# Patient Record
Sex: MALE | Race: WHITE | NOT HISPANIC OR LATINO | Employment: FULL TIME | URBAN - METROPOLITAN AREA
[De-identification: names, ages, dates, MRNs, and addresses within clinical notes are randomized per-mention and may not be internally consistent; named-entity substitution may affect disease eponyms.]

---

## 2020-12-28 ENCOUNTER — OFFICE VISIT (OUTPATIENT)
Dept: URGENT CARE | Facility: CLINIC | Age: 55
End: 2020-12-28
Payer: COMMERCIAL

## 2020-12-28 ENCOUNTER — APPOINTMENT (EMERGENCY)
Dept: RADIOLOGY | Facility: HOSPITAL | Age: 55
DRG: 137 | End: 2020-12-28
Payer: COMMERCIAL

## 2020-12-28 ENCOUNTER — HOSPITAL ENCOUNTER (INPATIENT)
Facility: HOSPITAL | Age: 55
LOS: 3 days | Discharge: HOME | DRG: 137 | End: 2020-12-31
Attending: EMERGENCY MEDICINE | Admitting: FAMILY MEDICINE
Payer: COMMERCIAL

## 2020-12-28 VITALS
DIASTOLIC BLOOD PRESSURE: 73 MMHG | SYSTOLIC BLOOD PRESSURE: 110 MMHG | OXYGEN SATURATION: 90 % | BODY MASS INDEX: 42.95 KG/M2 | HEART RATE: 102 BPM | TEMPERATURE: 100.7 F | WEIGHT: 290 LBS | HEIGHT: 69 IN | RESPIRATION RATE: 22 BRPM

## 2020-12-28 DIAGNOSIS — U07.1 PNEUMONIA DUE TO COVID-19 VIRUS: ICD-10-CM

## 2020-12-28 DIAGNOSIS — J12.82 PNEUMONIA DUE TO COVID-19 VIRUS: ICD-10-CM

## 2020-12-28 DIAGNOSIS — R09.02 HYPOXIA: ICD-10-CM

## 2020-12-28 DIAGNOSIS — R09.02 HYPOXIA: Primary | ICD-10-CM

## 2020-12-28 DIAGNOSIS — R68.89 FLU-LIKE SYMPTOMS: Primary | ICD-10-CM

## 2020-12-28 DIAGNOSIS — U07.1 COVID-19: ICD-10-CM

## 2020-12-28 PROBLEM — E66.01 CLASS 3 SEVERE OBESITY DUE TO EXCESS CALORIES WITHOUT SERIOUS COMORBIDITY WITH BODY MASS INDEX (BMI) OF 40.0 TO 44.9 IN ADULT (HCC): Status: ACTIVE | Noted: 2020-12-28

## 2020-12-28 PROBLEM — E66.813 CLASS 3 SEVERE OBESITY DUE TO EXCESS CALORIES WITHOUT SERIOUS COMORBIDITY WITH BODY MASS INDEX (BMI) OF 40.0 TO 44.9 IN ADULT: Status: ACTIVE | Noted: 2020-12-28

## 2020-12-28 PROBLEM — G25.0 ESSENTIAL TREMOR: Status: ACTIVE | Noted: 2020-12-28

## 2020-12-28 PROBLEM — E87.6 HYPOKALEMIA: Status: ACTIVE | Noted: 2020-12-28

## 2020-12-28 PROBLEM — D69.6 THROMBOCYTOPENIA (HCC): Status: ACTIVE | Noted: 2020-12-28

## 2020-12-28 LAB
ALBUMIN SERPL BCP-MCNC: 3.3 G/DL (ref 3.5–5)
ALP SERPL-CCNC: 72 U/L (ref 46–116)
ALT SERPL W P-5'-P-CCNC: 77 U/L (ref 12–78)
ANION GAP SERPL CALCULATED.3IONS-SCNC: 12 MMOL/L (ref 4–13)
AST SERPL W P-5'-P-CCNC: 61 U/L (ref 5–45)
BASOPHILS # BLD AUTO: 0 THOUSANDS/ΜL (ref 0–0.1)
BASOPHILS NFR BLD AUTO: 0 % (ref 0–1)
BILIRUB SERPL-MCNC: 1.01 MG/DL (ref 0.2–1)
BUN SERPL-MCNC: 23 MG/DL (ref 5–25)
CALCIUM ALBUM COR SERPL-MCNC: 9.4 MG/DL (ref 8.3–10.1)
CALCIUM SERPL-MCNC: 8.8 MG/DL (ref 8.3–10.1)
CHLORIDE SERPL-SCNC: 103 MMOL/L (ref 100–108)
CK MB SERPL-MCNC: 0.7 NG/ML (ref 0–5)
CK MB SERPL-MCNC: <1 % (ref 0–2.5)
CK SERPL-CCNC: 405 U/L (ref 39–308)
CO2 SERPL-SCNC: 24 MMOL/L (ref 21–32)
CREAT SERPL-MCNC: 1.15 MG/DL (ref 0.6–1.3)
CRP SERPL QL: 36.7 MG/L
D DIMER PPP FEU-MCNC: 1.13 UG/ML FEU
EOSINOPHIL # BLD AUTO: 0.12 THOUSAND/ΜL (ref 0–0.61)
EOSINOPHIL NFR BLD AUTO: 2 % (ref 0–6)
ERYTHROCYTE [DISTWIDTH] IN BLOOD BY AUTOMATED COUNT: 12.5 % (ref 11.6–15.1)
FLUAV RNA RESP QL NAA+PROBE: NEGATIVE
FLUBV RNA RESP QL NAA+PROBE: NEGATIVE
GFR SERPL CREATININE-BSD FRML MDRD: 71 ML/MIN/1.73SQ M
GLUCOSE SERPL-MCNC: 115 MG/DL (ref 65–140)
HCT VFR BLD AUTO: 39.3 % (ref 36.5–49.3)
HGB BLD-MCNC: 13.1 G/DL (ref 12–17)
IMM GRANULOCYTES # BLD AUTO: 0.03 THOUSAND/UL (ref 0–0.2)
IMM GRANULOCYTES NFR BLD AUTO: 0 % (ref 0–2)
LACTATE SERPL-SCNC: 1.5 MMOL/L (ref 0.5–2)
LYMPHOCYTES # BLD AUTO: 0.43 THOUSANDS/ΜL (ref 0.6–4.47)
LYMPHOCYTES NFR BLD AUTO: 6 % (ref 14–44)
MCH RBC QN AUTO: 27.9 PG (ref 26.8–34.3)
MCHC RBC AUTO-ENTMCNC: 33.3 G/DL (ref 31.4–37.4)
MCV RBC AUTO: 84 FL (ref 82–98)
MONOCYTES # BLD AUTO: 0.75 THOUSAND/ΜL (ref 0.17–1.22)
MONOCYTES NFR BLD AUTO: 10 % (ref 4–12)
NEUTROPHILS # BLD AUTO: 6.11 THOUSANDS/ΜL (ref 1.85–7.62)
NEUTS SEG NFR BLD AUTO: 82 % (ref 43–75)
NRBC BLD AUTO-RTO: 0 /100 WBCS
NT-PROBNP SERPL-MCNC: 534 PG/ML
PLATELET # BLD AUTO: 98 THOUSANDS/UL (ref 149–390)
PMV BLD AUTO: 13.6 FL (ref 8.9–12.7)
POTASSIUM SERPL-SCNC: 3.3 MMOL/L (ref 3.5–5.3)
PROT SERPL-MCNC: 6.4 G/DL (ref 6.4–8.2)
RBC # BLD AUTO: 4.7 MILLION/UL (ref 3.88–5.62)
RSV RNA RESP QL NAA+PROBE: NEGATIVE
SARS-COV-2 RNA RESP QL NAA+PROBE: POSITIVE
SODIUM SERPL-SCNC: 139 MMOL/L (ref 136–145)
TROPONIN I SERPL-MCNC: 0.02 NG/ML
WBC # BLD AUTO: 7.44 THOUSAND/UL (ref 4.31–10.16)

## 2020-12-28 PROCEDURE — 83605 ASSAY OF LACTIC ACID: CPT | Performed by: EMERGENCY MEDICINE

## 2020-12-28 PROCEDURE — 99283 EMERGENCY DEPT VISIT LOW MDM: CPT | Performed by: PHYSICIAN ASSISTANT

## 2020-12-28 PROCEDURE — 0241U HB NFCT DS VIR RESP RNA 4 TRGT: CPT | Performed by: EMERGENCY MEDICINE

## 2020-12-28 PROCEDURE — 80053 COMPREHEN METABOLIC PANEL: CPT | Performed by: EMERGENCY MEDICINE

## 2020-12-28 PROCEDURE — 99285 EMERGENCY DEPT VISIT HI MDM: CPT | Performed by: EMERGENCY MEDICINE

## 2020-12-28 PROCEDURE — 86901 BLOOD TYPING SEROLOGIC RH(D): CPT | Performed by: NURSE PRACTITIONER

## 2020-12-28 PROCEDURE — 82728 ASSAY OF FERRITIN: CPT | Performed by: NURSE PRACTITIONER

## 2020-12-28 PROCEDURE — 99285 EMERGENCY DEPT VISIT HI MDM: CPT

## 2020-12-28 PROCEDURE — 85025 COMPLETE CBC W/AUTO DIFF WBC: CPT | Performed by: EMERGENCY MEDICINE

## 2020-12-28 PROCEDURE — 83880 ASSAY OF NATRIURETIC PEPTIDE: CPT | Performed by: EMERGENCY MEDICINE

## 2020-12-28 PROCEDURE — 99203 OFFICE O/P NEW LOW 30 MIN: CPT | Performed by: PHYSICIAN ASSISTANT

## 2020-12-28 PROCEDURE — G0382 LEV 3 HOSP TYPE B ED VISIT: HCPCS | Performed by: PHYSICIAN ASSISTANT

## 2020-12-28 PROCEDURE — 71045 X-RAY EXAM CHEST 1 VIEW: CPT

## 2020-12-28 PROCEDURE — 93005 ELECTROCARDIOGRAM TRACING: CPT

## 2020-12-28 PROCEDURE — 36415 COLL VENOUS BLD VENIPUNCTURE: CPT | Performed by: EMERGENCY MEDICINE

## 2020-12-28 PROCEDURE — 96361 HYDRATE IV INFUSION ADD-ON: CPT

## 2020-12-28 PROCEDURE — 96360 HYDRATION IV INFUSION INIT: CPT

## 2020-12-28 PROCEDURE — 86140 C-REACTIVE PROTEIN: CPT | Performed by: NURSE PRACTITIONER

## 2020-12-28 PROCEDURE — 99223 1ST HOSP IP/OBS HIGH 75: CPT | Performed by: NURSE PRACTITIONER

## 2020-12-28 PROCEDURE — 84145 PROCALCITONIN (PCT): CPT | Performed by: NURSE PRACTITIONER

## 2020-12-28 PROCEDURE — 84484 ASSAY OF TROPONIN QUANT: CPT | Performed by: EMERGENCY MEDICINE

## 2020-12-28 PROCEDURE — 85379 FIBRIN DEGRADATION QUANT: CPT | Performed by: NURSE PRACTITIONER

## 2020-12-28 PROCEDURE — 86900 BLOOD TYPING SEROLOGIC ABO: CPT | Performed by: NURSE PRACTITIONER

## 2020-12-28 PROCEDURE — 82553 CREATINE MB FRACTION: CPT | Performed by: NURSE PRACTITIONER

## 2020-12-28 PROCEDURE — 82550 ASSAY OF CK (CPK): CPT | Performed by: NURSE PRACTITIONER

## 2020-12-28 RX ORDER — CEFTRIAXONE 1 G/50ML
1000 INJECTION, SOLUTION INTRAVENOUS EVERY 24 HOURS
Status: DISCONTINUED | OUTPATIENT
Start: 2020-12-29 | End: 2020-12-31 | Stop reason: HOSPADM

## 2020-12-28 RX ORDER — POTASSIUM CHLORIDE 20 MEQ/1
40 TABLET, EXTENDED RELEASE ORAL ONCE
Status: COMPLETED | OUTPATIENT
Start: 2020-12-28 | End: 2020-12-28

## 2020-12-28 RX ORDER — ASCORBIC ACID 500 MG
1000 TABLET ORAL EVERY 12 HOURS SCHEDULED
Status: DISCONTINUED | OUTPATIENT
Start: 2020-12-28 | End: 2020-12-31 | Stop reason: HOSPADM

## 2020-12-28 RX ORDER — SODIUM CHLORIDE 9 MG/ML
150 INJECTION, SOLUTION INTRAVENOUS CONTINUOUS
Status: DISCONTINUED | OUTPATIENT
Start: 2020-12-28 | End: 2020-12-28

## 2020-12-28 RX ORDER — FAMOTIDINE 20 MG/1
20 TABLET, FILM COATED ORAL 2 TIMES DAILY
Status: DISCONTINUED | OUTPATIENT
Start: 2020-12-28 | End: 2020-12-31 | Stop reason: HOSPADM

## 2020-12-28 RX ORDER — DEXAMETHASONE SODIUM PHOSPHATE 4 MG/ML
6 INJECTION, SOLUTION INTRA-ARTICULAR; INTRALESIONAL; INTRAMUSCULAR; INTRAVENOUS; SOFT TISSUE EVERY 24 HOURS
Status: DISCONTINUED | OUTPATIENT
Start: 2020-12-28 | End: 2020-12-31 | Stop reason: HOSPADM

## 2020-12-28 RX ORDER — CEFTRIAXONE 1 G/50ML
1000 INJECTION, SOLUTION INTRAVENOUS ONCE
Status: COMPLETED | OUTPATIENT
Start: 2020-12-28 | End: 2020-12-28

## 2020-12-28 RX ORDER — ACETAMINOPHEN 325 MG/1
650 TABLET ORAL EVERY 6 HOURS PRN
Status: DISCONTINUED | OUTPATIENT
Start: 2020-12-28 | End: 2020-12-31 | Stop reason: HOSPADM

## 2020-12-28 RX ORDER — ZINC SULFATE 50(220)MG
220 CAPSULE ORAL DAILY
Status: DISCONTINUED | OUTPATIENT
Start: 2020-12-29 | End: 2020-12-31 | Stop reason: HOSPADM

## 2020-12-28 RX ORDER — CALCIUM CARBONATE 200(500)MG
500 TABLET,CHEWABLE ORAL DAILY PRN
Status: DISCONTINUED | OUTPATIENT
Start: 2020-12-28 | End: 2020-12-31 | Stop reason: HOSPADM

## 2020-12-28 RX ORDER — MULTIVITAMIN/IRON/FOLIC ACID 18MG-0.4MG
1 TABLET ORAL DAILY
Status: DISCONTINUED | OUTPATIENT
Start: 2021-01-05 | End: 2020-12-31 | Stop reason: HOSPADM

## 2020-12-28 RX ORDER — CEFTRIAXONE 1 G/50ML
1000 INJECTION, SOLUTION INTRAVENOUS ONCE
Status: DISCONTINUED | OUTPATIENT
Start: 2020-12-29 | End: 2020-12-28

## 2020-12-28 RX ORDER — MELATONIN
2000 DAILY
Status: DISCONTINUED | OUTPATIENT
Start: 2020-12-29 | End: 2020-12-31 | Stop reason: HOSPADM

## 2020-12-28 RX ADMIN — CALCIUM CARBONATE (ANTACID) CHEW TAB 500 MG 500 MG: 500 CHEW TAB at 21:56

## 2020-12-28 RX ADMIN — FAMOTIDINE 20 MG: 20 TABLET, FILM COATED ORAL at 21:25

## 2020-12-28 RX ADMIN — OXYCODONE HYDROCHLORIDE AND ACETAMINOPHEN 1000 MG: 500 TABLET ORAL at 21:25

## 2020-12-28 RX ADMIN — DOXYCYCLINE 100 MG: 100 INJECTION, POWDER, LYOPHILIZED, FOR SOLUTION INTRAVENOUS at 22:45

## 2020-12-28 RX ADMIN — CEFTRIAXONE 1000 MG: 1 INJECTION, SOLUTION INTRAVENOUS at 21:21

## 2020-12-28 RX ADMIN — SODIUM CHLORIDE 150 ML/HR: 0.9 INJECTION, SOLUTION INTRAVENOUS at 21:24

## 2020-12-28 RX ADMIN — DEXAMETHASONE SODIUM PHOSPHATE 6 MG: 4 INJECTION, SOLUTION INTRAMUSCULAR; INTRAVENOUS at 21:25

## 2020-12-28 RX ADMIN — SODIUM CHLORIDE 150 ML/HR: 0.9 INJECTION, SOLUTION INTRAVENOUS at 17:41

## 2020-12-28 RX ADMIN — POTASSIUM CHLORIDE 40 MEQ: 1500 TABLET, EXTENDED RELEASE ORAL at 21:25

## 2020-12-28 NOTE — ED PROVIDER NOTES
History  Chief Complaint   Patient presents with    Shortness of Breath     pt  seen at urgent care today fro " not feeling well", r/o covid symptoms, sob with exertion, 89% on room air upon arrival to ED , placed on 2l O@ 95%, productive cough of green sputum     71-year-old male complains of generally not feeling well since 12/23, not sleeping well, his boss sent him for COVID testing at urgent care was found to be hypoxemic and referred emergency department for evaluation  Denies fever and chills  Denies loss of taste or smell  Denies chest pain dyspnea  Past medical history includes  related toxic inhalation, tremor  Surgical history denies  Social history denies smoking drinking illicits      History provided by:  Patient  Shortness of Breath  Severity:  Mild  Onset quality:  Gradual  Timing:  Constant  Progression:  Unchanged  Chronicity:  New  Context: URI    Ineffective treatments:  None tried  Associated symptoms: cough    Associated symptoms: no abdominal pain, no chest pain, no hemoptysis, no sputum production and no vomiting        None       Past Medical History:   Diagnosis Date    Known health problems: none        Past Surgical History:   Procedure Laterality Date    NO PAST SURGERIES         Family History   Problem Relation Age of Onset    Heart disease Mother     COPD Father      I have reviewed and agree with the history as documented  E-Cigarette/Vaping    E-Cigarette Use Never User      E-Cigarette/Vaping Substances    Nicotine No     CBD No     Flavoring No      Social History     Tobacco Use    Smoking status: Never Smoker    Smokeless tobacco: Never Used   Substance Use Topics    Alcohol use: Never     Frequency: Never    Drug use: Never       Review of Systems   Respiratory: Positive for cough and shortness of breath  Negative for hemoptysis and sputum production  Cardiovascular: Negative for chest pain     Gastrointestinal: Negative for abdominal pain and vomiting  All other systems reviewed and are negative  Physical Exam  Physical Exam  Vitals signs and nursing note reviewed  Constitutional:       Comments: Pleasant, comfortable-appearing, conversational, upper extremities generally tremulous, mildly   HENT:      Head: Normocephalic and atraumatic  Eyes:      Conjunctiva/sclera: Conjunctivae normal       Pupils: Pupils are equal, round, and reactive to light  Neck:      Musculoskeletal: Neck supple  Cardiovascular:      Rate and Rhythm: Normal rate and regular rhythm  Heart sounds: Normal heart sounds  Pulmonary:      Effort: Pulmonary effort is normal       Comments: Crackles bilat  Abdominal:      General: Bowel sounds are normal  There is no distension  Palpations: Abdomen is soft  Tenderness: There is no abdominal tenderness  Musculoskeletal: Normal range of motion  Right lower leg: No edema  Left lower leg: No edema  Skin:     General: Skin is warm and dry  Neurological:      Mental Status: He is alert and oriented to person, place, and time  Cranial Nerves: No cranial nerve deficit  Coordination: Coordination normal    Psychiatric:         Behavior: Behavior normal          Thought Content:  Thought content normal          Judgment: Judgment normal          Vital Signs  ED Triage Vitals   Temperature Pulse Respirations Blood Pressure SpO2   12/28/20 1733 12/28/20 1733 12/28/20 1733 12/28/20 1733 12/28/20 1730   99 3 °F (37 4 °C) 91 22 103/75 95 %      Temp Source Heart Rate Source Patient Position - Orthostatic VS BP Location FiO2 (%)   12/28/20 1733 12/28/20 1733 12/28/20 1733 12/28/20 1733 --   Temporal Monitor Sitting Left arm       Pain Score       --                  Vitals:    12/28/20 1733   BP: 103/75   Pulse: 91   Patient Position - Orthostatic VS: Sitting         Visual Acuity  Visual Acuity      Most Recent Value   L Pupil Size (mm)  2   R Pupil Size (mm)  2          ED Medications  Medications   sodium chloride 0 9 % infusion (150 mL/hr Intravenous New Bag 12/28/20 1741)       Diagnostic Studies  Results Reviewed     Procedure Component Value Units Date/Time    COVID19, Influenza A/B, RSV PCR, [facility] [[id number]]  (Abnormal) Collected: 12/28/20 1742    Lab Status: Final result Specimen: Nares from Nasopharyngeal Swab Updated: 12/28/20 1843     SARS-CoV-2 Positive     INFLUENZA A PCR Negative     INFLUENZA B PCR Negative     RSV PCR Negative    Narrative: This test has been authorized by FDA under an EUA (Emergency Use Assay) for use by authorized laboratories  Clinical caution and judgement should be used with the interpretation of these results with consideration of the clinical impression and other laboratory testing  Testing reported as "Positive" or "Negative" has been proven to be accurate according to standard laboratory validation requirements  All testing is performed with control materials showing appropriate reactivity at standard intervals      CBC and differential [[id number]]  (Abnormal) Collected: 12/28/20 1742    Lab Status: Final result Specimen: Blood from Hand, Left Updated: 12/28/20 1822     WBC 7 44 Thousand/uL      RBC 4 70 Million/uL      Hemoglobin 13 1 g/dL      Hematocrit 39 3 %      MCV 84 fL      MCH 27 9 pg      MCHC 33 3 g/dL      RDW 12 5 %      MPV 13 6 fL      Platelets 98 Thousands/uL      nRBC 0 /100 WBCs      Neutrophils Relative 82 %      Immat GRANS % 0 %      Lymphocytes Relative 6 %      Monocytes Relative 10 %      Eosinophils Relative 2 %      Basophils Relative 0 %      Neutrophils Absolute 6 11 Thousands/µL      Immature Grans Absolute 0 03 Thousand/uL      Lymphocytes Absolute 0 43 Thousands/µL      Monocytes Absolute 0 75 Thousand/µL      Eosinophils Absolute 0 12 Thousand/µL      Basophils Absolute 0 00 Thousands/µL     NT-BNP PRO [[id number]]  (Abnormal) Collected: 12/28/20 1742    Lab Status: Final result Specimen: Blood from Hand, Left Updated: 12/28/20 1821     NT-proBNP 534 pg/mL     Troponin I [[id number]]  (Normal) Collected: 12/28/20 1742    Lab Status: Final result Specimen: Blood from Arm, Left Updated: 12/28/20 1819     Troponin I 0 02 ng/mL     Lactic acid [[id number]]  (Normal) Collected: 12/28/20 1742    Lab Status: Final result Specimen: Blood from Hand, Left Updated: 12/28/20 1819     LACTIC ACID 1 5 mmol/L     Narrative:      Result may be elevated if tourniquet was used during collection  Comprehensive metabolic panel [[id number]]  (Abnormal) Collected: 12/28/20 1742    Lab Status: Final result Specimen: Blood from Hand, Left Updated: 12/28/20 1817     Sodium 139 mmol/L      Potassium 3 3 mmol/L      Chloride 103 mmol/L      CO2 24 mmol/L      ANION GAP 12 mmol/L      BUN 23 mg/dL      Creatinine 1 15 mg/dL      Glucose 115 mg/dL      Calcium 8 8 mg/dL      Corrected Calcium 9 4 mg/dL      AST 61 U/L      ALT 77 U/L      Alkaline Phosphatase 72 U/L      Total Protein 6 4 g/dL      Albumin 3 3 g/dL      Total Bilirubin 1 01 mg/dL      eGFR 71 ml/min/1 73sq m     Narrative:      [place] guidelines for Chronic Kidney Disease (CKD):     Stage 1 with normal or high GFR (GFR > 90 mL/min/1 73 square meters)    Stage 2 Mild CKD (GFR = 60-89 mL/min/1 73 square meters)    Stage 3A Moderate CKD (GFR = 45-59 mL/min/1 73 square meters)    Stage 3B Moderate CKD (GFR = 30-44 mL/min/1 73 square meters)    Stage 4 Severe CKD (GFR = 15-29 mL/min/1 73 square meters)    Stage 5 End Stage CKD (GFR <15 mL/min/1 73 square meters)  Note: GFR calculation is accurate only with a steady state creatinine                 XR chest 1 view portable   ED Interpretation by Rod Woo DO (12/28 1920)   Patchy bilateral infiltrates                 Procedures  Procedures         ED Course  ED Course as of Dec 28 1937   Mon Dec 28, 2020   8799 EKG 5:37 p m   Normal sinus rhythm rate 90 normal axis normal intervals T-wave inversion V1 V2 V3 aVL no ST elevation or depression interpreted by me      1936 Patient aware of results and agreeable with hospitalization                                              MDM    Disposition  Final diagnoses:   Hypoxia   COVID-19     Time reflects when diagnosis was documented in both MDM as applicable and the Disposition within this note     Time User Action Codes Description Comment    12/28/2020  7:21 PM Inocente Bradley Add [R09 02] Hypoxia     12/28/2020  7:21 PM Luke Quintero Add [U07 1] COVID-19       ED Disposition     ED Disposition Condition Date/Time Comment    Admit Stable Mon Dec 28, 2020  7:37 PM Case was discussed with Ugo and the patient's admission status was agreed to be Admission Status: inpatient status to the service of Dr Adame Doing          Yoseph Search    None         Patient's Medications    No medications on file     No discharge procedures on file      PDMP Review     None          ED Provider  Electronically Signed by           Edy Nation DO  12/28/20 1937

## 2020-12-29 PROBLEM — E66.01 MORBID OBESITY (HCC): Status: ACTIVE | Noted: 2020-12-29

## 2020-12-29 LAB
ABO GROUP BLD: NORMAL
ALBUMIN SERPL BCP-MCNC: 3.2 G/DL (ref 3.5–5)
ALP SERPL-CCNC: 69 U/L (ref 46–116)
ALT SERPL W P-5'-P-CCNC: 81 U/L (ref 12–78)
ANION GAP SERPL CALCULATED.3IONS-SCNC: 9 MMOL/L (ref 4–13)
AST SERPL W P-5'-P-CCNC: 60 U/L (ref 5–45)
BACTERIA UR QL AUTO: ABNORMAL /HPF
BASOPHILS # BLD AUTO: 0 THOUSANDS/ΜL (ref 0–0.1)
BASOPHILS NFR BLD AUTO: 0 % (ref 0–1)
BILIRUB SERPL-MCNC: 0.8 MG/DL (ref 0.2–1)
BILIRUB UR QL STRIP: NEGATIVE
BUN SERPL-MCNC: 24 MG/DL (ref 5–25)
CALCIUM ALBUM COR SERPL-MCNC: 9.2 MG/DL (ref 8.3–10.1)
CALCIUM SERPL-MCNC: 8.6 MG/DL (ref 8.3–10.1)
CHLORIDE SERPL-SCNC: 102 MMOL/L (ref 100–108)
CLARITY UR: CLEAR
CO2 SERPL-SCNC: 28 MMOL/L (ref 21–32)
COLOR UR: ABNORMAL
CREAT SERPL-MCNC: 1.22 MG/DL (ref 0.6–1.3)
EOSINOPHIL # BLD AUTO: 0 THOUSAND/ΜL (ref 0–0.61)
EOSINOPHIL NFR BLD AUTO: 0 % (ref 0–6)
ERYTHROCYTE [DISTWIDTH] IN BLOOD BY AUTOMATED COUNT: 12.7 % (ref 11.6–15.1)
FERRITIN SERPL-MCNC: 645 NG/ML (ref 8–388)
GFR SERPL CREATININE-BSD FRML MDRD: 66 ML/MIN/1.73SQ M
GLUCOSE SERPL-MCNC: 142 MG/DL (ref 65–140)
GLUCOSE UR STRIP-MCNC: NEGATIVE MG/DL
HCT VFR BLD AUTO: 36.4 % (ref 36.5–49.3)
HCT VFR BLD AUTO: 39.1 % (ref 36.5–49.3)
HGB BLD-MCNC: 12 G/DL (ref 12–17)
HGB BLD-MCNC: 12.6 G/DL (ref 12–17)
HGB UR QL STRIP.AUTO: NEGATIVE
HGB UR QL STRIP.AUTO: NEGATIVE
HYALINE CASTS #/AREA URNS LPF: ABNORMAL /LPF
IMM GRANULOCYTES # BLD AUTO: 0.03 THOUSAND/UL (ref 0–0.2)
IMM GRANULOCYTES NFR BLD AUTO: 1 % (ref 0–2)
KETONES UR STRIP-MCNC: ABNORMAL MG/DL
LDH SERPL-CCNC: 358 U/L (ref 81–234)
LEUKOCYTE ESTERASE UR QL STRIP: NEGATIVE
LYMPHOCYTES # BLD AUTO: 0.55 THOUSANDS/ΜL (ref 0.6–4.47)
LYMPHOCYTES NFR BLD AUTO: 11 % (ref 14–44)
MCH RBC QN AUTO: 27.5 PG (ref 26.8–34.3)
MCHC RBC AUTO-ENTMCNC: 32.2 G/DL (ref 31.4–37.4)
MCV RBC AUTO: 85 FL (ref 82–98)
MONOCYTES # BLD AUTO: 0.33 THOUSAND/ΜL (ref 0.17–1.22)
MONOCYTES NFR BLD AUTO: 6 % (ref 4–12)
NEUTROPHILS # BLD AUTO: 4.35 THOUSANDS/ΜL (ref 1.85–7.62)
NEUTS SEG NFR BLD AUTO: 82 % (ref 43–75)
NITRITE UR QL STRIP: NEGATIVE
NON-SQ EPI CELLS URNS QL MICRO: ABNORMAL /HPF
NRBC BLD AUTO-RTO: 0 /100 WBCS
PH UR STRIP.AUTO: 6.5 [PH]
PLATELET # BLD AUTO: 109 THOUSANDS/UL (ref 149–390)
PMV BLD AUTO: 13.5 FL (ref 8.9–12.7)
POTASSIUM SERPL-SCNC: 3.5 MMOL/L (ref 3.5–5.3)
PROCALCITONIN SERPL-MCNC: 0.12 NG/ML
PROT SERPL-MCNC: 6.3 G/DL (ref 6.4–8.2)
PROT UR STRIP-MCNC: ABNORMAL MG/DL
RBC # BLD AUTO: 4.58 MILLION/UL (ref 3.88–5.62)
RBC #/AREA URNS AUTO: ABNORMAL /HPF
RBC, URINE: ABNORMAL
RH BLD: NEGATIVE
SODIUM SERPL-SCNC: 139 MMOL/L (ref 136–145)
SP GR UR STRIP.AUTO: 1.02 (ref 1–1.03)
UROBILINOGEN UR QL STRIP.AUTO: 1 E.U./DL
WBC # BLD AUTO: 5.26 THOUSAND/UL (ref 4.31–10.16)
WBC #/AREA URNS AUTO: ABNORMAL /HPF

## 2020-12-29 PROCEDURE — 80053 COMPREHEN METABOLIC PANEL: CPT | Performed by: NURSE PRACTITIONER

## 2020-12-29 PROCEDURE — 85025 COMPLETE CBC W/AUTO DIFF WBC: CPT | Performed by: NURSE PRACTITIONER

## 2020-12-29 PROCEDURE — 99232 SBSQ HOSP IP/OBS MODERATE 35: CPT | Performed by: FAMILY MEDICINE

## 2020-12-29 PROCEDURE — XW13325 TRANSFUSION OF CONVALESCENT PLASMA (NONAUTOLOGOUS) INTO PERIPHERAL VEIN, PERCUTANEOUS APPROACH, NEW TECHNOLOGY GROUP 5: ICD-10-PCS | Performed by: FAMILY MEDICINE

## 2020-12-29 PROCEDURE — 86900 BLOOD TYPING SEROLOGIC ABO: CPT | Performed by: FAMILY MEDICINE

## 2020-12-29 PROCEDURE — 83615 LACTATE (LD) (LDH) ENZYME: CPT | Performed by: FAMILY MEDICINE

## 2020-12-29 PROCEDURE — 81001 URINALYSIS AUTO W/SCOPE: CPT | Performed by: FAMILY MEDICINE

## 2020-12-29 PROCEDURE — 85014 HEMATOCRIT: CPT | Performed by: FAMILY MEDICINE

## 2020-12-29 PROCEDURE — XW033E5 INTRODUCTION OF REMDESIVIR ANTI-INFECTIVE INTO PERIPHERAL VEIN, PERCUTANEOUS APPROACH, NEW TECHNOLOGY GROUP 5: ICD-10-PCS | Performed by: FAMILY MEDICINE

## 2020-12-29 PROCEDURE — 86901 BLOOD TYPING SEROLOGIC RH(D): CPT | Performed by: FAMILY MEDICINE

## 2020-12-29 PROCEDURE — 81003 URINALYSIS AUTO W/O SCOPE: CPT | Performed by: FAMILY MEDICINE

## 2020-12-29 PROCEDURE — 86880 COOMBS TEST DIRECT: CPT | Performed by: FAMILY MEDICINE

## 2020-12-29 PROCEDURE — 81015 MICROSCOPIC EXAM OF URINE: CPT | Performed by: FAMILY MEDICINE

## 2020-12-29 PROCEDURE — 85018 HEMOGLOBIN: CPT | Performed by: FAMILY MEDICINE

## 2020-12-29 RX ORDER — DIPHENHYDRAMINE HYDROCHLORIDE 50 MG/ML
25 INJECTION INTRAMUSCULAR; INTRAVENOUS EVERY 6 HOURS PRN
Status: DISCONTINUED | OUTPATIENT
Start: 2020-12-29 | End: 2020-12-31 | Stop reason: HOSPADM

## 2020-12-29 RX ADMIN — OXYCODONE HYDROCHLORIDE AND ACETAMINOPHEN 1000 MG: 500 TABLET ORAL at 20:27

## 2020-12-29 RX ADMIN — FAMOTIDINE 20 MG: 20 TABLET, FILM COATED ORAL at 17:19

## 2020-12-29 RX ADMIN — ACETAMINOPHEN 650 MG: 325 TABLET ORAL at 17:21

## 2020-12-29 RX ADMIN — CEFTRIAXONE 1000 MG: 1 INJECTION, SOLUTION INTRAVENOUS at 20:29

## 2020-12-29 RX ADMIN — ZINC SULFATE 220 MG (50 MG) CAPSULE 220 MG: CAPSULE at 08:41

## 2020-12-29 RX ADMIN — Medication 2000 UNITS: at 08:42

## 2020-12-29 RX ADMIN — FAMOTIDINE 20 MG: 20 TABLET, FILM COATED ORAL at 08:42

## 2020-12-29 RX ADMIN — ENOXAPARIN SODIUM 40 MG: 40 INJECTION SUBCUTANEOUS at 20:30

## 2020-12-29 RX ADMIN — REMDESIVIR 200 MG: 100 INJECTION, POWDER, LYOPHILIZED, FOR SOLUTION INTRAVENOUS at 06:20

## 2020-12-29 RX ADMIN — NYSTATIN 500000 UNITS: 100000 SUSPENSION ORAL at 17:19

## 2020-12-29 RX ADMIN — DOXYCYCLINE 100 MG: 100 INJECTION, POWDER, LYOPHILIZED, FOR SOLUTION INTRAVENOUS at 21:35

## 2020-12-29 RX ADMIN — ENOXAPARIN SODIUM 40 MG: 40 INJECTION SUBCUTANEOUS at 08:41

## 2020-12-29 RX ADMIN — DOXYCYCLINE 100 MG: 100 INJECTION, POWDER, LYOPHILIZED, FOR SOLUTION INTRAVENOUS at 08:45

## 2020-12-29 RX ADMIN — NYSTATIN 500000 UNITS: 100000 SUSPENSION ORAL at 13:18

## 2020-12-29 RX ADMIN — OXYCODONE HYDROCHLORIDE AND ACETAMINOPHEN 1000 MG: 500 TABLET ORAL at 08:41

## 2020-12-29 RX ADMIN — NYSTATIN 500000 UNITS: 100000 SUSPENSION ORAL at 21:38

## 2020-12-29 RX ADMIN — DEXAMETHASONE SODIUM PHOSPHATE 6 MG: 4 INJECTION, SOLUTION INTRAMUSCULAR; INTRAVENOUS at 20:27

## 2020-12-29 NOTE — ASSESSMENT & PLAN NOTE
· Presents with cough fever fatigue sore throat  · COVID positive  · 89% on room air-95% on 2 L  · Chest x-ray: Patchy bilateral infiltrates right greater than left  In the setting of clinically suspected/proven COVID-19, this plain film appearance while nonspecific, can be seen in cases of viral pneumonia such as COVID-19     · , troponin <0 02,   · CRP 36 7, ferritin pending, D-dimer pending  · Procalcitonin pending  · Mild COVID pathway  · Oxygen protocol  · Respiratory protocol  · Vitamin-D, vitamin-C, zinc  · Doxycycline and ceftriaxone  · Remdesivir  · Decadron, Pepcid  · Lovenox

## 2020-12-29 NOTE — PLAN OF CARE
Problem: PAIN - ADULT  Goal: Verbalizes/displays adequate comfort level or baseline comfort level  Description: Interventions:  - Encourage patient to monitor pain and request assistance  - Assess pain using appropriate pain scale  - Administer analgesics based on type and severity of pain and evaluate response  - Implement non-pharmacological measures as appropriate and evaluate response  - Consider cultural and social influences on pain and pain management  - Notify physician/advanced practitioner if interventions unsuccessful or patient reports new pain  Outcome: Progressing     Problem: INFECTION - ADULT  Goal: Absence or prevention of progression during hospitalization  Description: INTERVENTIONS:  - Assess and monitor for signs and symptoms of infection  - Monitor lab/diagnostic results  - Monitor all insertion sites, i e  indwelling lines, tubes, and drains  - Monitor endotracheal if appropriate and nasal secretions for changes in amount and color  - [place] appropriate cooling/warming therapies per order  - Administer medications as ordered  - Instruct and encourage patient and family to use good hand hygiene technique  - Identify and instruct in appropriate isolation precautions for identified infection/condition  Outcome: Progressing     Problem: SAFETY ADULT  Goal: Patient will remain free of falls  Description: INTERVENTIONS:  - Assess patient frequently for physical needs  -  Identify cognitive and physical deficits and behaviors that affect risk of falls    -  [place] fall precautions as indicated by assessment   - Educate patient/family on patient safety including physical limitations  - Instruct patient to call for assistance with activity based on assessment  - Modify environment to reduce risk of injury  - Consider OT/PT consult to assist with strengthening/mobility  Outcome: Progressing  Goal: Maintain or return to baseline ADL function  Description: INTERVENTIONS:  -  Assess patient's ability to carry out ADLs; assess patient's baseline for ADL function and identify physical deficits which impact ability to perform ADLs (bathing, care of mouth/teeth, toileting, grooming, dressing, etc )  - Assess/evaluate cause of self-care deficits   - Assess range of motion  - Assess patient's mobility; develop plan if impaired  - Assess patient's need for assistive devices and provide as appropriate  - Encourage maximum independence but intervene and supervise when necessary  - Involve family in performance of ADLs  - Assess for home care needs following discharge   - Consider OT consult to assist with ADL evaluation and planning for discharge  - Provide patient education as appropriate  Outcome: Progressing  Goal: Maintain or return mobility status to optimal level  Description: INTERVENTIONS:  - Assess patient's baseline mobility status (ambulation, transfers, stairs, etc )    - Identify cognitive and physical deficits and behaviors that affect mobility  - Identify mobility aids required to assist with transfers and/or ambulation (gait belt, sit-to-stand, lift, walker, cane, etc )  - [place] fall precautions as indicated by assessment  - Record patient progress and toleration of activity level on Mobility SBAR; progress patient to next Phase/Stage  - Instruct patient to call for assistance with activity based on assessment  - Consider rehabilitation consult to assist with strengthening/weightbearing, etc   Outcome: Progressing     Problem: DISCHARGE PLANNING  Goal: Discharge to home or other facility with appropriate resources  Description: INTERVENTIONS:  - Identify barriers to discharge w/patient and caregiver  - Arrange for needed discharge resources and transportation as appropriate  - Identify discharge learning needs (meds, wound care, etc )  - Arrange for interpretive services to assist at discharge as needed  - Refer to Case Management Department for coordinating discharge planning if the patient needs post-hospital services based on physician/advanced practitioner order or complex needs related to functional status, cognitive ability, or social support system  Outcome: Progressing     Problem: Knowledge Deficit  Goal: Patient/family/caregiver demonstrates understanding of disease process, treatment plan, medications, and discharge instructions  Description: Complete learning assessment and assess knowledge base  Interventions:  - Provide teaching at level of understanding  - Provide teaching via preferred learning methods  Outcome: Progressing     Problem: Nutrition/Hydration-ADULT  Goal: Nutrient/Hydration intake appropriate for improving, restoring or maintaining nutritional needs  Description: Monitor and assess patient's nutrition/hydration status for malnutrition  Collaborate with interdisciplinary team and initiate plan and interventions as ordered  Monitor patient's weight and dietary intake as ordered or per policy  Utilize nutrition screening tool and intervene as necessary  Determine patient's food preferences and provide high-protein, high-caloric foods as appropriate       INTERVENTIONS:  - Monitor oral intake, urinary output, labs, and treatment plans  - Assess nutrition and hydration status and recommend course of action  - Evaluate amount of meals eaten  - Assist patient with eating if necessary   - Allow adequate time for meals  - Recommend/ encourage appropriate diets, oral nutritional supplements, and vitamin/mineral supplements  - Order, calculate, and assess calorie counts as needed  - Recommend, monitor, and adjust tube feedings and TPN/PPN based on assessed needs  - Assess need for intravenous fluids  - Provide specific nutrition/hydration education as appropriate  - Include patient/family/caregiver in decisions related to nutrition  Outcome: Progressing     Problem: Potential for Falls  Goal: Patient will remain free of falls  Description: INTERVENTIONS:  - Assess patient frequently for physical needs  -  Identify cognitive and physical deficits and behaviors that affect risk of falls    -  [place] fall precautions as indicated by assessment   - Educate patient/family on patient safety including physical limitations  - Instruct patient to call for assistance with activity based on assessment  - Modify environment to reduce risk of injury  - Consider OT/PT consult to assist with strengthening/mobility  Outcome: Progressing

## 2020-12-29 NOTE — UTILIZATION REVIEW
Initial Clinical Review    Admission: Date/Time/Statement:   Admission Orders (From admission, onward)     Ordered        12/28/20 1936  Inpatient Admission  Once                   Orders Placed This Encounter   Procedures    Inpatient Admission     Standing Status:   Standing     Number of Occurrences:   1     Order Specific Question:   Admitting Physician     Answer:   Nieves Verma [[id number]]     Order Specific Question:   Level of Care     Answer:   Med Surg [[id number]]     Order Specific Question:   Estimated length of stay     Answer:   More than 2 Midnights     Order Specific Question:   Certification     Answer:   I certify that inpatient services are medically necessary for this patient for a duration of greater than two midnights  See H&P and MD Progress Notes for additional information about the patient's course of treatment  ED Arrival Information     Expected Arrival Acuity Means of Arrival Escorted By Service Admission Type    12/28/2020 12/28/2020 17:26 Urgent Ambulance [facility] Hospitalist Urgent    Arrival Complaint    Flu-like symptoms        Chief Complaint   Patient presents with    Shortness of Breath     pt  seen at urgent care today fro " not feeling well", r/o covid symptoms, sob with exertion, 89% on room air upon arrival to ED , placed on 2l O@ 95%, productive cough of green sputum     Assessment/Plan: 62 yo m with pmh of obesity( BMI 40-44 9 and essential tremor  presented to an urgent care where he was found to be hypoxia sat of 90% RA  He was sent to the ED for evaluation with complaint of chills fever, congestion and a sore throat  He reports a gradual onset of symptoms  He was found to be COVID positive, CXR with patchy b/l infiltrates R > L  He is admitted inpatient  and started on COVID protrocols           ED Triage Vitals   Temperature Pulse Respirations Blood Pressure SpO2   12/28/20 1733 12/28/20 1733 12/28/20 1733 12/28/20 1733 12/28/20 1730   99 3 °F (37 4 °C) 91 22 103/75 95 %      Temp Source Heart Rate Source Patient Position - Orthostatic VS BP Location FiO2 (%)   12/28/20 1733 12/28/20 1733 12/28/20 1733 12/28/20 1733 --   Temporal Monitor Sitting Left arm       Pain Score       12/28/20 2013       No Pain          Wt Readings from Last 1 Encounters:   12/28/20 132 kg (290 lb)     Additional Vital Signs:   Date/Time Temp Pulse Resp BP SpO2 Weight [facility]   12/28/20 1900 -- 80 21 110/75 96 % -- DM   12/28/20 1733 99 3 °F (37 4 °C) 91 22 103/75 95 % 132 kg (290 lb) DM   12/28/20 1730 -- -- -- -- 95 % -- DM   12/28/20 1656 -- -- -- 110/73 -- --            Pertinent Labs/Diagnostic Test Results:   Results from last 7 days   Lab Units 12/28/20  1742   SARS-COV-2  Positive*     Results from last 7 days   Lab Units 12/29/20  0603 12/28/20  1742   WBC Thousand/uL 5 26 7 44   HEMOGLOBIN g/dL 12 6 13 1   HEMATOCRIT % 39 1 39 3   PLATELETS Thousands/uL 109* 98*   NEUTROS ABS Thousands/µL 4 35 6 11         Results from last 7 days   Lab Units 12/29/20  0602 12/28/20  1742   SODIUM mmol/L 139 139   POTASSIUM mmol/L 3 5 3 3*   CHLORIDE mmol/L 102 103   CO2 mmol/L 28 24   ANION GAP mmol/L 9 12   BUN mg/dL 24 23   CREATININE mg/dL 1 22 1 15   EGFR ml/min/1 73sq m 66 71   CALCIUM mg/dL 8 6 8 8     Results from last 7 days   Lab Units 12/29/20  0602 12/28/20  1742   AST U/L 60* 61*   ALT U/L 81* 77   ALK PHOS U/L 69 72   TOTAL PROTEIN g/dL 6 3* 6 4   ALBUMIN g/dL 3 2* 3 3*   TOTAL BILIRUBIN mg/dL 0 80 1 01*         Results from last 7 days   Lab Units 12/29/20  0602 12/28/20  1742   GLUCOSE RANDOM mg/dL 142* 115     Results from last 7 days   Lab Units 12/28/20  1742   CK TOTAL U/L 405*   CK MB INDEX % <1 0   CK MB ng/mL 0 7     Results from last 7 days   Lab Units 12/28/20  1742   TROPONIN I ng/mL 0 02     Results from last 7 days   Lab Units 12/28/20  2149   D-DIMER QUANTITATIVE ug/ml FEU 1 13*       Results from last 7 days   Lab Units 12/28/20  2149   PROCALCITONIN ng/ml 0 12     Results from last 7 days   Lab Units 12/28/20  1742   LACTIC ACID mmol/L 1 5     Results from last 7 days   Lab Units 12/28/20  1742   NT-PRO BNP pg/mL 534*     Results from last 7 days   Lab Units 12/28/20  1742   FERRITIN ng/mL 645*     Results from last 7 days   Lab Units 12/28/20  1742   CRP mg/L 36 7*     Results from last 7 days   Lab Units 12/28/20  1742   INFLUENZA A PCR  Negative   INFLUENZA B PCR  Negative   RSV PCR  Negative       CXR 12/28 - Patchy bilateral infiltrates right greater than left   In the setting of clinically suspected/proven COVID-19, this plain film appearance while nonspecific, can be seen in cases of viral pneumonia such as COVID-19       ECG 12/28 - NSR - T wave inversions V1, V2, V3 aVL, no st elevations or depressions         ED Treatment:   Medication Administration from 12/28/2020 1700 to 12/28/2020 2008       Date/Time Order Dose Route Action Comments     12/28/2020 1741 sodium chloride 0 9 % infusion 150 mL/hr Intravenous New Bag         Past Medical History:   Diagnosis Date    Known health problems: none      Present on Admission:  **None**      Admitting Diagnosis: Shortness of breath [R06 02]  Hypoxia [R09 02]  COVID-19 [U07 1]  Age/Sex: 63 y o  male       Admission Orders:  Scheduled Medications:  ascorbic acid, 1,000 mg, Oral, Q12H MARIA A  cefTRIAXone, 1,000 mg, Intravenous, Q24H  cholecalciferol, 2,000 Units, Oral, Daily  dexamethasone, 6 mg, Intravenous, Q24H  doxycycline, 100 mg, Intravenous, Q12H  enoxaparin, 40 mg, Subcutaneous, Q12H MARIA A  famotidine, 20 mg, Oral, BID  zinc sulfate, 220 mg, Oral, Daily    Followed by  Denver Beal ON 1/5/2021] multivitamin-minerals, 1 tablet, Oral, Daily  nystatin, 500,000 Units, Swish & Swallow, 4x Daily  [START ON 12/30/2020] remdesivir, 100 mg, Intravenous, Q24H      Continuous IV Infusions:  Convalescent plasma  1 unit on 12/29     PRN Meds:  acetaminophen, 650 mg, Oral, Q6H PRN  calcium carbonate, 500 mg, Oral, Daily PRN       Nursing Orders - VS - up & OOB as tolerated - SCD's to le's- I & O q shift - O2 [id number] to sat >92% - activity as tolerated -  Diet regular house -  Contact and airborne isolation         Network Utilization Review Department  ATTENTION: Please call with any questions or concerns to [phone] and carefully listen to the prompts so that you are directed to the right person  All voicemails are confidential   Pattie Rodríguez all requests for admission clinical reviews, approved or denied determinations and any other requests to dedicated fax number below belonging to the campus where the patient is receiving treatment   List of dedicated fax numbers for the Facilities:  [address] DENIALS (Administrative/Medical Necessity) [phone]   [address] (Maternity/NICU/Pediatrics) [phone]   [address] Kesha Viera 2506 (  Marie Montez "Lilibeth" 103) [address] [place] [address] Gerardo Cowart [address] [place] [address] [phone]

## 2020-12-29 NOTE — ASSESSMENT & PLAN NOTE
· Essential tremor present-relates to chemical exposure while in [facility]  · Not on medication

## 2020-12-29 NOTE — PROGRESS NOTES
Progress Note - Noreen Mckinley 1965, 63 y o  male MRN: [id number]    Unit/Bed#: -01 Encounter: [id number]    Primary Care Provider: Candida Jackson MD   Date and time admitted to hospital: 12/28/2020  5:26 PM        Morbid obesity ([address] )  Assessment & Plan  · Counseled on lifestyle modifications    Essential tremor  Assessment & Plan  · Essential tremor present-relates to chemical exposure while in   · Not on medication    Thrombocytopenia (HCC)  Assessment & Plan  · Platelets 98  · Daily CBC and trend platelets  · Monitor for bleeding  · Improved suspect related to coronavirus  Hypokalemia  Assessment & Plan  · Resolved    Class 3 severe obesity due to excess calories without serious comorbidity with body mass index (BMI) of 40 0 to 44 9 in [facility])  Assessment & Plan  · Encourage therapeutic lifestyle modification    * Pneumonia due to COVID-19 virus  Assessment & Plan  · Presents with cough fever fatigue sore throat  · COVID positive  · 89% on room air-95% on 2 L  · Chest x-ray: Patchy bilateral infiltrates right greater than left  In the setting of clinically suspected/proven COVID-19, this plain film appearance while nonspecific, can be seen in cases of viral pneumonia such as COVID-19  · , troponin <0 02,   · CRP 36 7, ferritin high , ddimer 1 13 -  · Procalcitonin pending  · Mild COVID pathway  · Oxygen protocol  · Respiratory protocol  · The patient is on oxygen he is morbidly obese high risk of decompensation discussed a convalescent plasma the side effects of transfusion reaction patient agreed to receive will order  · Convalescent Plasma was ordered on 12/29/2020  The Fact Sheet for Patients and Parents/Caregivers was provided to the patient and/or healthcare agent  The option to accept or refuse administration was offered  The risks, benefits, and alternatives to treatment were reviewed, and all questions addressed   Disclosure that this treatment is authorized for use under EUA and not FDA approved for treatment was discussed  · Vitamin-D, vitamin-C, zinc  · Doxycycline and ceftriaxone  · Remdesivir 2/5  · Decadron, Pepcid  · Lovenox        VTE Pharmacologic Prophylaxis:   Pharmacologic: Enoxaparin (Lovenox)  Mechanical VTE Prophylaxis in Place: Yes    Patient Centered Rounds: I have performed bedside rounds with nursing staff today  Discussions with Specialists or Other Care Team Provider:  Discussed with nursing    Education and Discussions with Family / Patient:  Patient and I updated wife    Time Spent for Care: 30 minutes  More than 50% of total time spent on counseling and coordination of care as described above  Current Length of Stay: 1 day(s)    Current Patient Status: Inpatient   Certification Statement: The patient will continue to require additional inpatient hospital stay due to COVID-19 pneumonia    Discharge Plan:  When medically cleared    Code Status: Level 1 - Full Code      Subjective:   Patient seen and examined no chest pain positive for shortness of breath and cough    Objective:     Vitals:   Temp (24hrs), Av 1 °F (37 3 °C), Min:97 5 °F (36 4 °C), Max:100 7 °F (38 2 °C)    Temp:  [97 5 °F (36 4 °C)-100 7 °F (38 2 °C)] 97 5 °F (36 4 °C)  HR:  [] 66  Resp:  [18-22] 19  BP: (103-121)/(73-84) 121/78  SpO2:  [90 %-96 %] 93 %  Body mass index is 42 83 kg/m²  Input and Output Summary (last 24 hours): Intake/Output Summary (Last 24 hours) at 2020 1141  Last data filed at 2020 0845  Gross per 24 hour   Intake 640 ml   Output    Net 640 ml       Physical Exam:     Physical Exam  Constitutional:       Appearance: He is obese  HENT:      Head: Normocephalic and atraumatic  Eyes:      Extraocular Movements: Extraocular movements intact  Pupils: Pupils are equal, round, and reactive to light  Cardiovascular:      Rate and Rhythm: Normal rate and regular rhythm     Pulmonary:      Effort: Pulmonary effort is normal       Comments: Decreased bilaterally  Abdominal:      General: Abdomen is flat  Bowel sounds are normal       Palpations: Abdomen is soft  Musculoskeletal:         General: No swelling  Neurological:      General: No focal deficit present  Mental Status: He is oriented to person, place, and time  Mental status is at baseline  Psychiatric:         Mood and Affect: Mood normal            Additional Data:     Labs:    Results from last 7 days   Lab Units 12/29/20  0603   WBC Thousand/uL 5 26   HEMOGLOBIN g/dL 12 6   HEMATOCRIT % 39 1   PLATELETS Thousands/uL 109*   NEUTROS PCT % 82*   LYMPHS PCT % 11*   MONOS PCT % 6   EOS PCT % 0     Results from last 7 days   Lab Units 12/29/20  0602   SODIUM mmol/L 139   POTASSIUM mmol/L 3 5   CHLORIDE mmol/L 102   CO2 mmol/L 28   BUN mg/dL 24   CREATININE mg/dL 1 22   ANION GAP mmol/L 9   CALCIUM mg/dL 8 6   ALBUMIN g/dL 3 2*   TOTAL BILIRUBIN mg/dL 0 80   ALK PHOS U/L 69   ALT U/L 81*   AST U/L 60*   GLUCOSE RANDOM mg/dL 142*                 Results from last 7 days   Lab Units 12/28/20  2149 12/28/20  1742   LACTIC ACID mmol/L  --  1 5   PROCALCITONIN ng/ml 0 12  --            * I Have Reviewed All Lab Data Listed Above  * Additional Pertinent Lab Tests Reviewed:  All Labs Within Last 24 Hours Reviewed    Imaging:    Imaging Reports Reviewed Today Include: none today  Imaging Personally Reviewed by Myself Includes:      Recent Cultures (last 7 days):           Last 24 Hours Medication List:   Current Facility-Administered Medications   Medication Dose Route Frequency Provider Last Rate    acetaminophen  650 mg Oral Q6H PRN MICHELLE KentNP      ascorbic acid  1,000 mg Oral Q12H [address] Young Riser Amadio, DO      calcium carbonate  500 mg Oral Daily PRN Lucy Gomez CRNP      cefTRIAXone  1,000 mg Intravenous Q24H KERA Kent      cholecalciferol  2,000 Units Oral Daily Young Riser Amadio, DO      dexamethasone  6 mg Intravenous Q24H Young Riser Amadio, DO      doxycycline  100 mg Intravenous Q12H KERA Busch Stopped (12/29/20 0945)    enoxaparin  40 mg Subcutaneous Q12H [address] KERA Busch      famotidine  20 mg Oral BID Jordin Fu Amadio, DO      zinc sulfate  220 mg Oral Daily [place] Fu Amadio, DO      Followed by   Scott Aguirre ON 1/5/2021] multivitamin-minerals  1 tablet Oral Daily Juan J Leyden, DO      nystatin  500,000 Units Swish & Swallow 4x Daily MD Poncho Mcdonald [facility] ON 12/30/2020] remdesivir  100 mg Intravenous Q24H KERA Busch          Today, Patient Was Seen By: Haylee Ace MD    ** Please Note: Dictation voice to text software may have been used in the creation of this document   **

## 2020-12-29 NOTE — PLAN OF CARE
Problem: PAIN - ADULT  Goal: Verbalizes/displays adequate comfort level or baseline comfort level  Description: Interventions:  - Encourage patient to monitor pain and request assistance  - Assess pain using appropriate pain scale  - Administer analgesics based on type and severity of pain and evaluate response  - Implement non-pharmacological measures as appropriate and evaluate response  - Consider cultural and social influences on pain and pain management  - Notify physician/advanced practitioner if interventions unsuccessful or patient reports new pain  Outcome: Progressing     Problem: INFECTION - ADULT  Goal: Absence or prevention of progression during hospitalization  Description: INTERVENTIONS:  - Assess and monitor for signs and symptoms of infection  - Monitor lab/diagnostic results  - Monitor all insertion sites, i e  indwelling lines, tubes, and drains  - Monitor endotracheal if appropriate and nasal secretions for changes in amount and color  - [place] appropriate cooling/warming therapies per order  - Administer medications as ordered  - Instruct and encourage patient and family to use good hand hygiene technique  - Identify and instruct in appropriate isolation precautions for identified infection/condition  Outcome: Progressing     Problem: SAFETY ADULT  Goal: Patient will remain free of falls  Description: INTERVENTIONS:  - Assess patient frequently for physical needs  -  Identify cognitive and physical deficits and behaviors that affect risk of falls    -  [place] fall precautions as indicated by assessment   - Educate patient/family on patient safety including physical limitations  - Instruct patient to call for assistance with activity based on assessment  - Modify environment to reduce risk of injury  - Consider OT/PT consult to assist with strengthening/mobility  Outcome: Progressing  Goal: Maintain or return to baseline ADL function  Description: INTERVENTIONS:  -  Assess patient's ability to carry out ADLs; assess patient's baseline for ADL function and identify physical deficits which impact ability to perform ADLs (bathing, care of mouth/teeth, toileting, grooming, dressing, etc )  - Assess/evaluate cause of self-care deficits   - Assess range of motion  - Assess patient's mobility; develop plan if impaired  - Assess patient's need for assistive devices and provide as appropriate  - Encourage maximum independence but intervene and supervise when necessary  - Involve family in performance of ADLs  - Assess for home care needs following discharge   - Consider OT consult to assist with ADL evaluation and planning for discharge  - Provide patient education as appropriate  Outcome: Progressing  Goal: Maintain or return mobility status to optimal level  Description: INTERVENTIONS:  - Assess patient's baseline mobility status (ambulation, transfers, stairs, etc )    - Identify cognitive and physical deficits and behaviors that affect mobility  - Identify mobility aids required to assist with transfers and/or ambulation (gait belt, sit-to-stand, lift, walker, cane, etc )  - [place] fall precautions as indicated by assessment  - Record patient progress and toleration of activity level on Mobility SBAR; progress patient to next Phase/Stage  - Instruct patient to call for assistance with activity based on assessment  - Consider rehabilitation consult to assist with strengthening/weightbearing, etc   Outcome: Progressing     Problem: DISCHARGE PLANNING  Goal: Discharge to home or other facility with appropriate resources  Description: INTERVENTIONS:  - Identify barriers to discharge w/patient and caregiver  - Arrange for needed discharge resources and transportation as appropriate  - Identify discharge learning needs (meds, wound care, etc )  - Arrange for interpretive services to assist at discharge as needed  - Refer to Case Management Department for coordinating discharge planning if the patient needs post-hospital services based on physician/advanced practitioner order or complex needs related to functional status, cognitive ability, or social support system  Outcome: Progressing     Problem: Knowledge Deficit  Goal: Patient/family/caregiver demonstrates understanding of disease process, treatment plan, medications, and discharge instructions  Description: Complete learning assessment and assess knowledge base  Interventions:  - Provide teaching at level of understanding  - Provide teaching via preferred learning methods  Outcome: Progressing     Problem: Nutrition/Hydration-ADULT  Goal: Nutrient/Hydration intake appropriate for improving, restoring or maintaining nutritional needs  Description: Monitor and assess patient's nutrition/hydration status for malnutrition  Collaborate with interdisciplinary team and initiate plan and interventions as ordered  Monitor patient's weight and dietary intake as ordered or per policy  Utilize nutrition screening tool and intervene as necessary  Determine patient's food preferences and provide high-protein, high-caloric foods as appropriate       INTERVENTIONS:  - Monitor oral intake, urinary output, labs, and treatment plans  - Assess nutrition and hydration status and recommend course of action  - Evaluate amount of meals eaten  - Assist patient with eating if necessary   - Allow adequate time for meals  - Recommend/ encourage appropriate diets, oral nutritional supplements, and vitamin/mineral supplements  - Order, calculate, and assess calorie counts as needed  - Recommend, monitor, and adjust tube feedings and TPN/PPN based on assessed needs  - Assess need for intravenous fluids  - Provide specific nutrition/hydration education as appropriate  - Include patient/family/caregiver in decisions related to nutrition  Outcome: Progressing

## 2020-12-29 NOTE — ASSESSMENT & PLAN NOTE
· Essential tremor present-relates to chemical exposure while in [facility]  · Not on medication

## 2020-12-29 NOTE — NURSING NOTE
Pulse ox 87-88% on RA, increased to 1L nc  Pulse ox increased to 91%  Denies complaints  No resp distress noted

## 2020-12-29 NOTE — ASSESSMENT & PLAN NOTE
· Potassium 3 3  · Received 40 mEq potassium orally  · Recheck metabolic panel in the morning and trend potassium

## 2020-12-29 NOTE — NURSING NOTE
Pt noted to have increase in temp after plasma initiated, from 98 7 to 102  6  transfusion stopped  Dr Viviana Bojorquez & blood bank notified  Tylenol given  New orders noted  Denies further s/s  Will continue to monitor

## 2020-12-29 NOTE — ASSESSMENT & PLAN NOTE
· Platelets 98  · Daily CBC and trend platelets  · Monitor for bleeding  · Improved suspect related to coronavirus

## 2020-12-29 NOTE — ASSESSMENT & PLAN NOTE
· Presents with cough fever fatigue sore throat  · COVID positive  · 89% on room air-95% on 2 L  · Chest x-ray: Patchy bilateral infiltrates right greater than left  In the setting of clinically suspected/proven COVID-19, this plain film appearance while nonspecific, can be seen in cases of viral pneumonia such as COVID-19  · , troponin <0 02,   · CRP 36 7, ferritin high , ddimer 1 13 -  · Procalcitonin pending  · Mild COVID pathway  · Oxygen protocol  · Respiratory protocol  · The patient is on oxygen he is morbidly obese high risk of decompensation discussed a convalescent plasma the side effects of transfusion reaction patient agreed to receive will order  · Convalescent Plasma was ordered on 12/29/2020  The Fact Sheet for Patients and Parents/Caregivers was provided to the patient and/or healthcare agent  The option to accept or refuse administration was offered  The risks, benefits, and alternatives to treatment were reviewed, and all questions addressed  Disclosure that this treatment is authorized for use under EUA and not FDA approved for treatment was discussed     · Vitamin-D, vitamin-C, zinc  · Doxycycline and ceftriaxone  · Remdesivir 2/5  · Decadron, Pepcid  · Lovenox

## 2020-12-29 NOTE — SOCIAL WORK
Current LOS 1 day  No CM consult  CM completed chart  Pt case discussed with attending Dr Yeimy Love  Pt admitted with pneumonia d/t COVID  Pt currently requiring 1L NC supplemental O2  IV remdesivir therapy started  CM spoke with pt via telephone in room,baseline information was obtained  CM discussed the role of CM in helping the patient develop a discharge plan and assist the patient in carry out their plan  Pt lives with wife and daughter in a 1 SH, 3 LORENA  Pt completes ADLs independently  Pt ambulates independently  No AD  No DME  Pt is employed  Pt drives  Pt has no hx of STR or HHC  Pt denies hx of MH or D&A  PCP: Dr Patricia Sotelo  Preferred Pharmacy: [facility] on [address] [place] [address] in [address] [place] [address] Drive: Jorge A Whitehead (wife) [phone]  Pt has no formal POA  Pt states wife would assist with decision making  Transportation at time of d/c tbd  CM will continue to follow pt medical course and assist with d/c planning as needed

## 2020-12-29 NOTE — H&P
[facility] Internal Medicine    H&P- Shahid Childes 1965, 63 y o  male MRN: [id number]    Unit/Bed#: -Jase Encounter: [id number]    Primary Care Provider: Florence Kraft MD   Date and time admitted to hospital: 12/28/2020  5:26 PM        * Pneumonia due to COVID-19 virus  Assessment & Plan  · Presents with cough fever fatigue sore throat  · COVID positive  · 89% on room air-95% on 2 L  · Chest x-ray: Patchy bilateral infiltrates right greater than left  In the setting of clinically suspected/proven COVID-19, this plain film appearance while nonspecific, can be seen in cases of viral pneumonia such as COVID-19  · , troponin <0 02,   · CRP 36 7, ferritin pending, D-dimer pending  · Procalcitonin pending  · Mild COVID pathway  · Oxygen protocol  · Respiratory protocol  · Vitamin-D, vitamin-C, zinc  · Doxycycline and ceftriaxone  · Remdesivir  · Decadron, Pepcid  · Lovenox      Essential tremor  Assessment & Plan  · Essential tremor present-relates to chemical exposure while in [facility]  · Not on medication    Thrombocytopenia (HCC)  Assessment & Plan  · Platelets 98  · Daily CBC and trend platelets  · Monitor for bleeding    Hypokalemia  Assessment & Plan  · Potassium 3 3  · Received 40 mEq potassium orally  · Recheck metabolic panel in the morning and trend potassium    Class 3 severe obesity due to excess calories without serious comorbidity with body mass index (BMI) of 40 0 to 44 9 in [facility])  Assessment & Plan  · Encourage therapeutic lifestyle modification    VTE Prophylaxis: Enoxaparin (Lovenox)  / sequential compression device   Code Status:  Full  POLST: POLST form is not discussed and not completed at this time  Discussion with family:  Patient    Anticipated Length of Stay:  Patient will be admitted on an Inpatient basis with an anticipated length of stay of  greater than 2 midnights     Justification for Hospital Stay:  Per plan above    Total Time for Visit, including Counseling / Coordination of Care: 30 minutes  Greater than 50% of this total time spent on direct patient counseling and coordination of care  Chief Complaint:   Cough, myalgias    History of Present Illness:    Shahid Mensah is a 63 y o  male with obesity and essential tremor who presents with cough and myalgias  He had gone to an urgent care and they found him to have an oxygen saturation of 90% on room air  He says that he has had chills and fever, he has congestion and a sore throat  He says he has had a cough, but no shortness of breath  He describes symptoms of gradual onset, mild, and constant  He denies chest pain, abdominal pain, nausea, vomiting, or diarrhea  Review of Systems:    Review of Systems   Constitutional: Positive for chills and fever  HENT: Positive for congestion and sore throat  Eyes: Negative for visual disturbance  Respiratory: Positive for cough  Negative for shortness of breath  Cardiovascular: Negative for chest pain, palpitations and leg swelling  Gastrointestinal: Negative for abdominal distention, abdominal pain, blood in stool, constipation, diarrhea, nausea and vomiting  Genitourinary: Negative for dysuria and flank pain  Musculoskeletal: Negative for arthralgias and myalgias  Skin: Negative for pallor and rash  Neurological: Negative for dizziness, seizures, syncope, weakness, numbness and headaches  All other systems reviewed and are negative  Past Medical and Surgical History:     Past Medical History:   Diagnosis Date    Known health problems: none        Past Surgical History:   Procedure Laterality Date    NO PAST SURGERIES         Meds/Allergies:    Prior to Admission medications    Not on File     I have reviewed home medications with patient personally      Allergies: No Known Allergies    Social History:     Marital Status: /Civil Union   Occupation:  Installs fire sprNoxilizer  Patient Pre-hospital Living Situation:  Lives with wife and daughter  Patient Pre-hospital Level of Mobility:  Independent  Patient Pre-hospital Diet Restrictions:  None  Substance Use History:   Social History     Substance and Sexual Activity   Alcohol Use Never    Frequency: Never     Social History     Tobacco Use   Smoking Status Never Smoker   Smokeless Tobacco Never Used     Social History     Substance and Sexual Activity   Drug Use Never       Family History:    non-contributory    Physical Exam:     Vitals:   Blood Pressure: 119/84 (12/28/20 2013)  Pulse: 78 (12/28/20 2013)  Temperature: 98 9 °F (37 2 °C) (12/28/20 2013)  Temp Source: Oral (12/28/20 2013)  Respirations: 18 (12/28/20 2013)  Height: 5' 9" (175 3 cm) (12/28/20 2013)  Weight - Scale: 132 kg (290 lb) (12/28/20 2013)  SpO2: 95 % (12/28/20 2013)    Physical Exam  Vitals signs and nursing note reviewed  Constitutional:       Appearance: He is obese  HENT:      Head: Normocephalic and atraumatic  Mouth/Throat:      Mouth: Mucous membranes are moist       Pharynx: Oropharynx is clear  Eyes:      Extraocular Movements: Extraocular movements intact  Pupils: Pupils are equal, round, and reactive to light  Neck:      Musculoskeletal: Normal range of motion and neck supple  Cardiovascular:      Rate and Rhythm: Normal rate and regular rhythm  Pulses: Normal pulses  Heart sounds: Normal heart sounds  Pulmonary:      Effort: Pulmonary effort is normal       Breath sounds: Rales present  Abdominal:      General: Bowel sounds are normal       Palpations: Abdomen is soft  Tenderness: There is no abdominal tenderness  Musculoskeletal: Normal range of motion  Skin:     General: Skin is warm and dry  Capillary Refill: Capillary refill takes less than 2 seconds  Neurological:      General: No focal deficit present  Mental Status: He is alert and oriented to person, place, and time  Motor: Tremor present           Additional Data:     Lab Results: I have personally reviewed pertinent reports  Results from last 7 days   Lab Units 12/28/20  1742   WBC Thousand/uL 7 44   HEMOGLOBIN g/dL 13 1   HEMATOCRIT % 39 3   PLATELETS Thousands/uL 98*   NEUTROS PCT % 82*   LYMPHS PCT % 6*   MONOS PCT % 10   EOS PCT % 2     Results from last 7 days   Lab Units 12/28/20  1742   SODIUM mmol/L 139   POTASSIUM mmol/L 3 3*   CHLORIDE mmol/L 103   CO2 mmol/L 24   BUN mg/dL 23   CREATININE mg/dL 1 15   ANION GAP mmol/L 12   CALCIUM mg/dL 8 8   ALBUMIN g/dL 3 3*   TOTAL BILIRUBIN mg/dL 1 01*   ALK PHOS U/L 72   ALT U/L 77   AST U/L 61*   GLUCOSE RANDOM mg/dL 115                 Results from last 7 days   Lab Units 12/28/20  1742   LACTIC ACID mmol/L 1 5       Imaging: I have personally reviewed pertinent reports  XR chest 1 view portable   ED Interpretation by Liz Morris DO (12/28 1920)   Patchy bilateral infiltrates      Final Result by Ilda Concepcion MD (12/28 2008)      Patchy bilateral infiltrates right greater than left  In the setting of clinically suspected/proven COVID-19, this plain film appearance while nonspecific, can be seen in cases of viral pneumonia such as COVID-19  Workstation performed: [id number]             EKG, Pathology, and Other Studies Reviewed on Admission:   · EKG:  NSR rate of 90     Allscripts / Epic Records Reviewed: Yes     ** Please Note: This note has been constructed using a voice recognition system   **

## 2020-12-29 NOTE — UTILIZATION REVIEW
Notification of Inpatient Admission/Inpatient Authorization Request   This is a Notification of Inpatient Admission for K[address]  Be advised that this patient was admitted to our facility under Inpatient Status  Contact Elda Gee at [phone] for additional admission information  [facility] DEPT  DEDICATED [phone]  Patient Name:   Vicki Belle   YOB: 1965       [address]   [address]:   [address]  Tax ID: [id number]  NPI: [id number] Attending Provider/NPI:  Phone:  Address: Irwin Cash Md [[id number]]  [phone]  [facility] AS FACILITY   Place of Service Code: 24 Place of Service Name:  [address] [place] [address]   Start Date: 12/28/20 1936     Discharge Date & Time: No discharge date for patient encounter  Type of Admission: Inpatient Status Discharge Disposition (if discharged): Final discharge disposition not confirmed   Patient Diagnoses: Shortness of breath [R06 02]  Hypoxia [R09 02]  COVID-19 [U07 1]     Orders: Admission Orders (From admission, onward)     Ordered        12/28/20 1936  Inpatient Admission  Once                    Assigned Utilization Review Contact: Elda Gee  Utilization   Network Utilization Review Department  Phone: [phone]; Fax [phone]  Email: [email or web address]   ATTENTION PAYERS: Please call the assigned Utilization  directly with any questions or concerns ALL voicemails in the department are confidential  Send all requests for admission clinical reviews, approved or denied determinations and any other requests to dedicated fax number belonging to the campus where the patient is receiving treatment

## 2020-12-30 LAB
ABO GROUP BLD BPU: NORMAL
ALBUMIN SERPL BCP-MCNC: 2.8 G/DL (ref 3.5–5)
ALP SERPL-CCNC: 62 U/L (ref 46–116)
ALT SERPL W P-5'-P-CCNC: 83 U/L (ref 12–78)
ANION GAP SERPL CALCULATED.3IONS-SCNC: 8 MMOL/L (ref 4–13)
AST SERPL W P-5'-P-CCNC: 50 U/L (ref 5–45)
ATRIAL RATE: 90 BPM
BASOPHILS # BLD AUTO: 0 THOUSANDS/ΜL (ref 0–0.1)
BASOPHILS NFR BLD AUTO: 0 % (ref 0–1)
BILIRUB SERPL-MCNC: 0.67 MG/DL (ref 0.2–1)
BPU ID: NORMAL
BUN SERPL-MCNC: 23 MG/DL (ref 5–25)
CALCIUM ALBUM COR SERPL-MCNC: 9.4 MG/DL (ref 8.3–10.1)
CALCIUM SERPL-MCNC: 8.4 MG/DL (ref 8.3–10.1)
CHLORIDE SERPL-SCNC: 103 MMOL/L (ref 100–108)
CO2 SERPL-SCNC: 28 MMOL/L (ref 21–32)
CREAT SERPL-MCNC: 1.08 MG/DL (ref 0.6–1.3)
EOSINOPHIL # BLD AUTO: 0 THOUSAND/ΜL (ref 0–0.61)
EOSINOPHIL NFR BLD AUTO: 0 % (ref 0–6)
ERYTHROCYTE [DISTWIDTH] IN BLOOD BY AUTOMATED COUNT: 12.6 % (ref 11.6–15.1)
GFR SERPL CREATININE-BSD FRML MDRD: 76 ML/MIN/1.73SQ M
GLUCOSE SERPL-MCNC: 146 MG/DL (ref 65–140)
HCT VFR BLD AUTO: 36 % (ref 36.5–49.3)
HGB BLD-MCNC: 12.1 G/DL (ref 12–17)
IMM GRANULOCYTES # BLD AUTO: 0.04 THOUSAND/UL (ref 0–0.2)
IMM GRANULOCYTES NFR BLD AUTO: 1 % (ref 0–2)
LYMPHOCYTES # BLD AUTO: 0.41 THOUSANDS/ΜL (ref 0.6–4.47)
LYMPHOCYTES NFR BLD AUTO: 10 % (ref 14–44)
MCH RBC QN AUTO: 28.5 PG (ref 26.8–34.3)
MCHC RBC AUTO-ENTMCNC: 33.6 G/DL (ref 31.4–37.4)
MCV RBC AUTO: 85 FL (ref 82–98)
MONOCYTES # BLD AUTO: 0.42 THOUSAND/ΜL (ref 0.17–1.22)
MONOCYTES NFR BLD AUTO: 10 % (ref 4–12)
NEUTROPHILS # BLD AUTO: 3.46 THOUSANDS/ΜL (ref 1.85–7.62)
NEUTS SEG NFR BLD AUTO: 79 % (ref 43–75)
NRBC BLD AUTO-RTO: 0 /100 WBCS
P AXIS: 64 DEGREES
PLATELET # BLD AUTO: 97 THOUSANDS/UL (ref 149–390)
PMV BLD AUTO: 14 FL (ref 8.9–12.7)
POTASSIUM SERPL-SCNC: 3.6 MMOL/L (ref 3.5–5.3)
PR INTERVAL: 170 MS
PROCALCITONIN SERPL-MCNC: 0.07 NG/ML
PROT SERPL-MCNC: 5.7 G/DL (ref 6.4–8.2)
QRS AXIS: 54 DEGREES
QRSD INTERVAL: 96 MS
QT INTERVAL: 394 MS
QTC INTERVAL: 481 MS
RBC # BLD AUTO: 4.25 MILLION/UL (ref 3.88–5.62)
SODIUM SERPL-SCNC: 139 MMOL/L (ref 136–145)
T WAVE AXIS: 62 DEGREES
UNIT DISPENSE STATUS: NORMAL
UNIT PRODUCT CODE: NORMAL
UNIT RH: NORMAL
VENTRICULAR RATE: 90 BPM
WBC # BLD AUTO: 4.33 THOUSAND/UL (ref 4.31–10.16)

## 2020-12-30 PROCEDURE — 80053 COMPREHEN METABOLIC PANEL: CPT | Performed by: FAMILY MEDICINE

## 2020-12-30 PROCEDURE — 99232 SBSQ HOSP IP/OBS MODERATE 35: CPT | Performed by: FAMILY MEDICINE

## 2020-12-30 PROCEDURE — 84145 PROCALCITONIN (PCT): CPT | Performed by: NURSE PRACTITIONER

## 2020-12-30 PROCEDURE — 85025 COMPLETE CBC W/AUTO DIFF WBC: CPT | Performed by: FAMILY MEDICINE

## 2020-12-30 RX ADMIN — Medication 2000 UNITS: at 09:50

## 2020-12-30 RX ADMIN — FAMOTIDINE 20 MG: 20 TABLET, FILM COATED ORAL at 09:49

## 2020-12-30 RX ADMIN — ENOXAPARIN SODIUM 40 MG: 40 INJECTION SUBCUTANEOUS at 09:50

## 2020-12-30 RX ADMIN — CEFTRIAXONE 1000 MG: 1 INJECTION, SOLUTION INTRAVENOUS at 20:41

## 2020-12-30 RX ADMIN — NYSTATIN 500000 UNITS: 100000 SUSPENSION ORAL at 14:25

## 2020-12-30 RX ADMIN — OXYCODONE HYDROCHLORIDE AND ACETAMINOPHEN 1000 MG: 500 TABLET ORAL at 09:49

## 2020-12-30 RX ADMIN — OXYCODONE HYDROCHLORIDE AND ACETAMINOPHEN 1000 MG: 500 TABLET ORAL at 21:39

## 2020-12-30 RX ADMIN — NYSTATIN 500000 UNITS: 100000 SUSPENSION ORAL at 21:38

## 2020-12-30 RX ADMIN — DOXYCYCLINE 100 MG: 100 INJECTION, POWDER, LYOPHILIZED, FOR SOLUTION INTRAVENOUS at 21:39

## 2020-12-30 RX ADMIN — NYSTATIN 500000 UNITS: 100000 SUSPENSION ORAL at 17:49

## 2020-12-30 RX ADMIN — DOXYCYCLINE 100 MG: 100 INJECTION, POWDER, LYOPHILIZED, FOR SOLUTION INTRAVENOUS at 10:11

## 2020-12-30 RX ADMIN — DEXAMETHASONE SODIUM PHOSPHATE 6 MG: 4 INJECTION, SOLUTION INTRAMUSCULAR; INTRAVENOUS at 20:41

## 2020-12-30 RX ADMIN — REMDESIVIR 100 MG: 100 INJECTION, POWDER, LYOPHILIZED, FOR SOLUTION INTRAVENOUS at 06:14

## 2020-12-30 RX ADMIN — ENOXAPARIN SODIUM 40 MG: 40 INJECTION SUBCUTANEOUS at 21:39

## 2020-12-30 RX ADMIN — FAMOTIDINE 20 MG: 20 TABLET, FILM COATED ORAL at 17:49

## 2020-12-30 RX ADMIN — ZINC SULFATE 220 MG (50 MG) CAPSULE 220 MG: CAPSULE at 09:49

## 2020-12-30 RX ADMIN — NYSTATIN 500000 UNITS: 100000 SUSPENSION ORAL at 09:49

## 2020-12-30 NOTE — PROGRESS NOTES
Progress Note - Prateek Zimmer 1965, 63 y o  male MRN: [id number]    Unit/Bed#: -01 Encounter: [id number]    Primary Care Provider: Taco Olvera MD   Date and time admitted to hospital: 12/28/2020  5:26 PM        Morbid obesity ([address] )  Assessment & Plan  · Counseled on lifestyle modifications    Essential tremor  Assessment & Plan  · Essential tremor present-relates to chemical exposure while in   · Not on medication    Thrombocytopenia (HCC)  Assessment & Plan  · Platelets 98  · Daily CBC and trend platelets  · Monitor for bleeding  · Monitor closely    Hypokalemia  Assessment & Plan  · Resolved    Class 3 severe obesity due to excess calories without serious comorbidity with body mass index (BMI) of 40 0 to 44 9 in [facility])  Assessment & Plan  · Encourage therapeutic lifestyle modification    * Pneumonia due to COVID-19 virus  Assessment & Plan  · Presents with cough fever fatigue sore throat- symptoms started 12/25 -   · COVID positive  · 89% on room air-95% on 2 L- now he is down to 1 L of oxygen satting 96%  · Chest x-ray: Patchy bilateral infiltrates right greater than left  In the setting of clinically suspected/proven COVID-19, this plain film appearance while nonspecific, can be seen in cases of viral pneumonia such as COVID-19  · , troponin <0 02,  will trend tomorrow  · CRP 36 7, ferritin high , ddimer 1 13 -will trend tomorrow  · First procalcitonin is negative at 2 were negative will discontinue antibiotics  · Mild COVID pathway  · Oxygen protocol  · Respiratory protocol  · The patient is on oxygen he is morbidly obese high risk of decompensation discussed a convalescent plasma the side effects of transfusion reaction patient agreed to receive will order  · Convalescent Plasma was ordered on 12/30/2020  The Fact Sheet for Patients and Parents/Caregivers was provided to the patient and/or healthcare agent   The option to accept or refuse administration was offered  The risks, benefits, and alternatives to treatment were reviewed, and all questions addressed  Disclosure that this treatment is authorized for use under EUA and not FDA approved for treatment was discussed  Patient actually had a transfusion reaction with fever yesterday  Now the fever resolved  · Vitamin-D, vitamin-C, zinc  · Doxycycline and ceftriaxone  · Remdesivir 3/  · Decadron, Pepcid  · Lovenox  · Patient is feeling better today possible anticipation to discharge in 24 hours will do home O2 studies on  will require isolation till         VTE Pharmacologic Prophylaxis:   Pharmacologic: Enoxaparin (Lovenox)  Mechanical VTE Prophylaxis in Place: Yes    Patient Centered Rounds: I have performed bedside rounds with nursing staff today  Discussions with Specialists or Other Care Team Provider:  Discussed with nursing    Education and Discussions with Family / Patient:  Patient and updated white    Time Spent for Care: 30 minutes  More than 50% of total time spent on counseling and coordination of care as described above  Current Length of Stay: 2 day(s)    Current Patient Status: Inpatient   Certification Statement: The patient will continue to require additional inpatient hospital stay due to COVID-19 pneumonia    Discharge Plan:  Anticipate discharge home in 24 hours    Code Status: Level 1 - Full Code      Subjective:   Patient seen and examined, sob improved, cough improved  No chest pain    Objective:     Vitals:   Temp (24hrs), Av 4 °F (37 4 °C), Min:97 7 °F (36 5 °C), Max:102 6 °F (39 2 °C)    Temp:  [97 7 °F (36 5 °C)-102 6 °F (39 2 °C)] 97 7 °F (36 5 °C)  HR:  [] 66  Resp:  [17-22] 17  BP: (120-158)/(67-81) 128/74  SpO2:  [91 %-96 %] 96 %  Body mass index is 42 83 kg/m²  Input and Output Summary (last 24 hours):        Intake/Output Summary (Last 24 hours) at 2020 1118  Last data filed at 2020 0955  Gross per 24 hour   Intake 951 ml Output 300 ml   Net 651 ml       Physical Exam:     Physical Exam  Constitutional:       Appearance: He is obese  Eyes:      Extraocular Movements: Extraocular movements intact  Pupils: Pupils are equal, round, and reactive to light  Cardiovascular:      Rate and Rhythm: Normal rate and regular rhythm  Pulmonary:      Effort: Pulmonary effort is normal  No respiratory distress  Breath sounds: No wheezing or rales  Comments: decreased  Abdominal:      General: Abdomen is flat  Bowel sounds are normal       Palpations: Abdomen is soft  Musculoskeletal:         General: No swelling  Neurological:      General: No focal deficit present  Mental Status: He is oriented to person, place, and time  Mental status is at baseline  Psychiatric:         Mood and Affect: Mood normal            Additional Data:     Labs:    Results from last 7 days   Lab Units 12/30/20  0549   WBC Thousand/uL 4 33   HEMOGLOBIN g/dL 12 1   HEMATOCRIT % 36 0*   PLATELETS Thousands/uL 97*   NEUTROS PCT % 79*   LYMPHS PCT % 10*   MONOS PCT % 10   EOS PCT % 0     Results from last 7 days   Lab Units 12/30/20  0549   SODIUM mmol/L 139   POTASSIUM mmol/L 3 6   CHLORIDE mmol/L 103   CO2 mmol/L 28   BUN mg/dL 23   CREATININE mg/dL 1 08   ANION GAP mmol/L 8   CALCIUM mg/dL 8 4   ALBUMIN g/dL 2 8*   TOTAL BILIRUBIN mg/dL 0 67   ALK PHOS U/L 62   ALT U/L 83*   AST U/L 50*   GLUCOSE RANDOM mg/dL 146*                 Results from last 7 days   Lab Units 12/28/20  2149 12/28/20  1742   LACTIC ACID mmol/L  --  1 5   PROCALCITONIN ng/ml 0 12  --            * I Have Reviewed All Lab Data Listed Above  * Additional Pertinent Lab Tests Reviewed:  All Labs Within Last 24 Hours Reviewed    Imaging:    Imaging Reports Reviewed Today Include:  Not available  Imaging Personally Reviewed by Myself Includes:      Recent Cultures (last 7 days):           Last 24 Hours Medication List:   Current Facility-Administered Medications   Medication Dose Route Frequency Provider Last Rate    acetaminophen  650 mg Oral Q6H PRN Zuleyma Sprung, CRNP      ascorbic acid  1,000 mg Oral Q12H [address] Gerhardt Or Amadio, DO      calcium carbonate  500 mg Oral Daily PRN Zuleyma Sprung, CRNP      cefTRIAXone  1,000 mg Intravenous Q24H Zuleyma Sprung, CRNP 1,000 mg (12/29/20 2029)    cholecalciferol  2,000 Units Oral Daily Gerhardt Or Amadio, DO      dexamethasone  6 mg Intravenous Q24H Gerhardt Or Amadio, DO      diphenhydrAMINE  25 mg Intravenous Q6H PRN Elroy Israel MD      doxycycline  100 mg Intravenous Q12H Zuleyma Sprung, CRNP 100 mg (12/30/20 1011)    enoxaparin  40 mg Subcutaneous Q12H [address] Zuleyma Sprung, CRNP      famotidine  20 mg Oral BID Gerhardt Or Amadio, DO      zinc sulfate  220 mg Oral Daily Gerhardt Or Amadio, DO      Followed by   Rocky Nelson ON 1/5/2021] multivitamin-minerals  1 tablet Oral Daily Gerhardt Or Amadio, DO      nystatin  500,000 Units Swish & Swallow 4x Daily Mckayla Macias MD      remdesivir  100 mg Intravenous Q24H Zuleyma Sprung, CRNP 100 mg (12/30/20 9356)        Today, Patient Was Seen By: Mckayla Macias MD    ** Please Note: Dictation voice to text software may have been used in the creation of this document   **

## 2020-12-30 NOTE — PLAN OF CARE
Problem: PAIN - ADULT  Goal: Verbalizes/displays adequate comfort level or baseline comfort level  Description: Interventions:  - Encourage patient to monitor pain and request assistance  - Assess pain using appropriate pain scale  - Administer analgesics based on type and severity of pain and evaluate response  - Implement non-pharmacological measures as appropriate and evaluate response  - Consider cultural and social influences on pain and pain management  - Notify physician/advanced practitioner if interventions unsuccessful or patient reports new pain  Outcome: Progressing     Problem: INFECTION - ADULT  Goal: Absence or prevention of progression during hospitalization  Description: INTERVENTIONS:  - Assess and monitor for signs and symptoms of infection  - Monitor lab/diagnostic results  - Monitor all insertion sites, i e  indwelling lines, tubes, and drains  - Monitor endotracheal if appropriate and nasal secretions for changes in amount and color  - [place] appropriate cooling/warming therapies per order  - Administer medications as ordered  - Instruct and encourage patient and family to use good hand hygiene technique  - Identify and instruct in appropriate isolation precautions for identified infection/condition  Outcome: Progressing     Problem: SAFETY ADULT  Goal: Patient will remain free of falls  Description: INTERVENTIONS:  - Assess patient frequently for physical needs  -  Identify cognitive and physical deficits and behaviors that affect risk of falls    -  [place] fall precautions as indicated by assessment   - Educate patient/family on patient safety including physical limitations  - Instruct patient to call for assistance with activity based on assessment  - Modify environment to reduce risk of injury  - Consider OT/PT consult to assist with strengthening/mobility  Outcome: Progressing  Goal: Maintain or return to baseline ADL function  Description: INTERVENTIONS:  -  Assess patient's ability to carry out ADLs; assess patient's baseline for ADL function and identify physical deficits which impact ability to perform ADLs (bathing, care of mouth/teeth, toileting, grooming, dressing, etc )  - Assess/evaluate cause of self-care deficits   - Assess range of motion  - Assess patient's mobility; develop plan if impaired  - Assess patient's need for assistive devices and provide as appropriate  - Encourage maximum independence but intervene and supervise when necessary  - Involve family in performance of ADLs  - Assess for home care needs following discharge   - Consider OT consult to assist with ADL evaluation and planning for discharge  - Provide patient education as appropriate  Outcome: Progressing  Goal: Maintain or return mobility status to optimal level  Description: INTERVENTIONS:  - Assess patient's baseline mobility status (ambulation, transfers, stairs, etc )    - Identify cognitive and physical deficits and behaviors that affect mobility  - Identify mobility aids required to assist with transfers and/or ambulation (gait belt, sit-to-stand, lift, walker, cane, etc )  - [place] fall precautions as indicated by assessment  - Record patient progress and toleration of activity level on Mobility SBAR; progress patient to next Phase/Stage  - Instruct patient to call for assistance with activity based on assessment  - Consider rehabilitation consult to assist with strengthening/weightbearing, etc   Outcome: Progressing     Problem: DISCHARGE PLANNING  Goal: Discharge to home or other facility with appropriate resources  Description: INTERVENTIONS:  - Identify barriers to discharge w/patient and caregiver  - Arrange for needed discharge resources and transportation as appropriate  - Identify discharge learning needs (meds, wound care, etc )  - Arrange for interpretive services to assist at discharge as needed  - Refer to Case Management Department for coordinating discharge planning if the patient needs post-hospital services based on physician/advanced practitioner order or complex needs related to functional status, cognitive ability, or social support system  Outcome: Progressing     Problem: Knowledge Deficit  Goal: Patient/family/caregiver demonstrates understanding of disease process, treatment plan, medications, and discharge instructions  Description: Complete learning assessment and assess knowledge base  Interventions:  - Provide teaching at level of understanding  - Provide teaching via preferred learning methods  Outcome: Progressing     Problem: Nutrition/Hydration-ADULT  Goal: Nutrient/Hydration intake appropriate for improving, restoring or maintaining nutritional needs  Description: Monitor and assess patient's nutrition/hydration status for malnutrition  Collaborate with interdisciplinary team and initiate plan and interventions as ordered  Monitor patient's weight and dietary intake as ordered or per policy  Utilize nutrition screening tool and intervene as necessary  Determine patient's food preferences and provide high-protein, high-caloric foods as appropriate       INTERVENTIONS:  - Monitor oral intake, urinary output, labs, and treatment plans  - Assess nutrition and hydration status and recommend course of action  - Evaluate amount of meals eaten  - Assist patient with eating if necessary   - Allow adequate time for meals  - Recommend/ encourage appropriate diets, oral nutritional supplements, and vitamin/mineral supplements  - Order, calculate, and assess calorie counts as needed  - Recommend, monitor, and adjust tube feedings and TPN/PPN based on assessed needs  - Assess need for intravenous fluids  - Provide specific nutrition/hydration education as appropriate  - Include patient/family/caregiver in decisions related to nutrition  Outcome: Progressing     Problem: Potential for Falls  Goal: Patient will remain free of falls  Description: INTERVENTIONS:  - Assess patient frequently for physical needs  -  Identify cognitive and physical deficits and behaviors that affect risk of falls    -  [place] fall precautions as indicated by assessment   - Educate patient/family on patient safety including physical limitations  - Instruct patient to call for assistance with activity based on assessment  - Modify environment to reduce risk of injury  - Consider OT/PT consult to assist with strengthening/mobility  Outcome: Progressing

## 2020-12-30 NOTE — SOCIAL WORK
Current LOS 2 days  CM completed chart review  Pt case discussed with attending Dr Subhash Mcdermott  Pt admitted with pneumonia d/t COVID  Pt remains stable on 1L NC  Pt received day 3 of IV Remdesivir today  Anticipating it no events overnight, pt will be d/c home tomorrow  Pt will have home O2 evaluation tomorrow  Pt will need Eliquis prescription at time of d/c  CM will continue to follow pt medical course and assist with d/c planning as needed

## 2020-12-30 NOTE — PLAN OF CARE
Problem: PAIN - ADULT  Goal: Verbalizes/displays adequate comfort level or baseline comfort level  Description: Interventions:  - Encourage patient to monitor pain and request assistance  - Assess pain using appropriate pain scale  - Administer analgesics based on type and severity of pain and evaluate response  - Implement non-pharmacological measures as appropriate and evaluate response  - Consider cultural and social influences on pain and pain management  - Notify physician/advanced practitioner if interventions unsuccessful or patient reports new pain  Outcome: Progressing     Problem: INFECTION - ADULT  Goal: Absence or prevention of progression during hospitalization  Description: INTERVENTIONS:  - Assess and monitor for signs and symptoms of infection  - Monitor lab/diagnostic results  - Monitor all insertion sites, i e  indwelling lines, tubes, and drains  - Monitor endotracheal if appropriate and nasal secretions for changes in amount and color  - [place] appropriate cooling/warming therapies per order  - Administer medications as ordered  - Instruct and encourage patient and family to use good hand hygiene technique  - Identify and instruct in appropriate isolation precautions for identified infection/condition  Outcome: Progressing     Problem: SAFETY ADULT  Goal: Patient will remain free of falls  Description: INTERVENTIONS:  - Assess patient frequently for physical needs  -  Identify cognitive and physical deficits and behaviors that affect risk of falls    -  [place] fall precautions as indicated by assessment   - Educate patient/family on patient safety including physical limitations  - Instruct patient to call for assistance with activity based on assessment  - Modify environment to reduce risk of injury  - Consider OT/PT consult to assist with strengthening/mobility  Outcome: Progressing  Goal: Maintain or return to baseline ADL function  Description: INTERVENTIONS:  -  Assess patient's ability to carry out ADLs; assess patient's baseline for ADL function and identify physical deficits which impact ability to perform ADLs (bathing, care of mouth/teeth, toileting, grooming, dressing, etc )  - Assess/evaluate cause of self-care deficits   - Assess range of motion  - Assess patient's mobility; develop plan if impaired  - Assess patient's need for assistive devices and provide as appropriate  - Encourage maximum independence but intervene and supervise when necessary  - Involve family in performance of ADLs  - Assess for home care needs following discharge   - Consider OT consult to assist with ADL evaluation and planning for discharge  - Provide patient education as appropriate  Outcome: Progressing  Goal: Maintain or return mobility status to optimal level  Description: INTERVENTIONS:  - Assess patient's baseline mobility status (ambulation, transfers, stairs, etc )    - Identify cognitive and physical deficits and behaviors that affect mobility  - Identify mobility aids required to assist with transfers and/or ambulation (gait belt, sit-to-stand, lift, walker, cane, etc )  - [place] fall precautions as indicated by assessment  - Record patient progress and toleration of activity level on Mobility SBAR; progress patient to next Phase/Stage  - Instruct patient to call for assistance with activity based on assessment  - Consider rehabilitation consult to assist with strengthening/weightbearing, etc   Outcome: Progressing     Problem: DISCHARGE PLANNING  Goal: Discharge to home or other facility with appropriate resources  Description: INTERVENTIONS:  - Identify barriers to discharge w/patient and caregiver  - Arrange for needed discharge resources and transportation as appropriate  - Identify discharge learning needs (meds, wound care, etc )  - Arrange for interpretive services to assist at discharge as needed  - Refer to Case Management Department for coordinating discharge planning if the patient needs post-hospital services based on physician/advanced practitioner order or complex needs related to functional status, cognitive ability, or social support system  Outcome: Progressing     Problem: Knowledge Deficit  Goal: Patient/family/caregiver demonstrates understanding of disease process, treatment plan, medications, and discharge instructions  Description: Complete learning assessment and assess knowledge base  Interventions:  - Provide teaching at level of understanding  - Provide teaching via preferred learning methods  Outcome: Progressing     Problem: Nutrition/Hydration-ADULT  Goal: Nutrient/Hydration intake appropriate for improving, restoring or maintaining nutritional needs  Description: Monitor and assess patient's nutrition/hydration status for malnutrition  Collaborate with interdisciplinary team and initiate plan and interventions as ordered  Monitor patient's weight and dietary intake as ordered or per policy  Utilize nutrition screening tool and intervene as necessary  Determine patient's food preferences and provide high-protein, high-caloric foods as appropriate       INTERVENTIONS:  - Monitor oral intake, urinary output, labs, and treatment plans  - Assess nutrition and hydration status and recommend course of action  - Evaluate amount of meals eaten  - Assist patient with eating if necessary   - Allow adequate time for meals  - Recommend/ encourage appropriate diets, oral nutritional supplements, and vitamin/mineral supplements  - Order, calculate, and assess calorie counts as needed  - Recommend, monitor, and adjust tube feedings and TPN/PPN based on assessed needs  - Assess need for intravenous fluids  - Provide specific nutrition/hydration education as appropriate  - Include patient/family/caregiver in decisions related to nutrition  Outcome: Progressing     Problem: Potential for Falls  Goal: Patient will remain free of falls  Description: INTERVENTIONS:  - Assess patient frequently for physical needs  -  Identify cognitive and physical deficits and behaviors that affect risk of falls    -  [place] fall precautions as indicated by assessment   - Educate patient/family on patient safety including physical limitations  - Instruct patient to call for assistance with activity based on assessment  - Modify environment to reduce risk of injury  - Consider OT/PT consult to assist with strengthening/mobility  Outcome: Progressing

## 2020-12-30 NOTE — ASSESSMENT & PLAN NOTE
· Presents with cough fever fatigue sore throat- symptoms started 12/25 -   · COVID positive  · 89% on room air-95% on 2 L- now he is down to 1 L of oxygen satting 96%  · Chest x-ray: Patchy bilateral infiltrates right greater than left  In the setting of clinically suspected/proven COVID-19, this plain film appearance while nonspecific, can be seen in cases of viral pneumonia such as COVID-19  · , troponin <0 02,  will trend tomorrow  · CRP 36 7, ferritin high , ddimer 1 13 -will trend tomorrow  · First procalcitonin is negative at 2 were negative will discontinue antibiotics  · Mild COVID pathway  · Oxygen protocol  · Respiratory protocol  · The patient is on oxygen he is morbidly obese high risk of decompensation discussed a convalescent plasma the side effects of transfusion reaction patient agreed to receive will order  · Convalescent Plasma was ordered on 12/30/2020  The Fact Sheet for Patients and Parents/Caregivers was provided to the patient and/or healthcare agent  The option to accept or refuse administration was offered  The risks, benefits, and alternatives to treatment were reviewed, and all questions addressed  Disclosure that this treatment is authorized for use under EUA and not FDA approved for treatment was discussed  Patient actually had a transfusion reaction with fever yesterday  Now the fever resolved    · Vitamin-D, vitamin-C, zinc  · Doxycycline and ceftriaxone  · Remdesivir 3/5  · Decadron, Pepcid  · Lovenox  · Patient is feeling better today possible anticipation to discharge in 24 hours will do home O2 studies on December 31st will require isolation till January 15th

## 2020-12-30 NOTE — ASSESSMENT & PLAN NOTE
· Essential tremor present-relates to chemical exposure while in [facility]  · Not on medication

## 2020-12-31 VITALS
RESPIRATION RATE: 20 BRPM | HEART RATE: 68 BPM | BODY MASS INDEX: 42.95 KG/M2 | TEMPERATURE: 97.4 F | WEIGHT: 290 LBS | DIASTOLIC BLOOD PRESSURE: 83 MMHG | OXYGEN SATURATION: 96 % | SYSTOLIC BLOOD PRESSURE: 140 MMHG | HEIGHT: 69 IN

## 2020-12-31 LAB
ALBUMIN SERPL BCP-MCNC: 2.8 G/DL (ref 3.5–5)
ALP SERPL-CCNC: 59 U/L (ref 46–116)
ALT SERPL W P-5'-P-CCNC: 87 U/L (ref 12–78)
ANION GAP SERPL CALCULATED.3IONS-SCNC: 9 MMOL/L (ref 4–13)
AST SERPL W P-5'-P-CCNC: 39 U/L (ref 5–45)
BASOPHILS # BLD AUTO: 0.01 THOUSANDS/ΜL (ref 0–0.1)
BASOPHILS NFR BLD AUTO: 0 % (ref 0–1)
BILIRUB SERPL-MCNC: 0.55 MG/DL (ref 0.2–1)
BUN SERPL-MCNC: 22 MG/DL (ref 5–25)
CALCIUM ALBUM COR SERPL-MCNC: 9.2 MG/DL (ref 8.3–10.1)
CALCIUM SERPL-MCNC: 8.2 MG/DL (ref 8.3–10.1)
CHLORIDE SERPL-SCNC: 104 MMOL/L (ref 100–108)
CO2 SERPL-SCNC: 27 MMOL/L (ref 21–32)
CREAT SERPL-MCNC: 0.98 MG/DL (ref 0.6–1.3)
CRP SERPL QL: 18.4 MG/L
D DIMER PPP FEU-MCNC: 0.65 UG/ML FEU
EOSINOPHIL # BLD AUTO: 0 THOUSAND/ΜL (ref 0–0.61)
EOSINOPHIL NFR BLD AUTO: 0 % (ref 0–6)
ERYTHROCYTE [DISTWIDTH] IN BLOOD BY AUTOMATED COUNT: 12.7 % (ref 11.6–15.1)
FERRITIN SERPL-MCNC: 541 NG/ML (ref 8–388)
GFR SERPL CREATININE-BSD FRML MDRD: 86 ML/MIN/1.73SQ M
GLUCOSE SERPL-MCNC: 149 MG/DL (ref 65–140)
HCT VFR BLD AUTO: 35.5 % (ref 36.5–49.3)
HGB BLD-MCNC: 11.8 G/DL (ref 12–17)
IMM GRANULOCYTES # BLD AUTO: 0.06 THOUSAND/UL (ref 0–0.2)
IMM GRANULOCYTES NFR BLD AUTO: 1 % (ref 0–2)
LYMPHOCYTES # BLD AUTO: 0.53 THOUSANDS/ΜL (ref 0.6–4.47)
LYMPHOCYTES NFR BLD AUTO: 9 % (ref 14–44)
MCH RBC QN AUTO: 28 PG (ref 26.8–34.3)
MCHC RBC AUTO-ENTMCNC: 33.2 G/DL (ref 31.4–37.4)
MCV RBC AUTO: 84 FL (ref 82–98)
MONOCYTES # BLD AUTO: 0.46 THOUSAND/ΜL (ref 0.17–1.22)
MONOCYTES NFR BLD AUTO: 8 % (ref 4–12)
NEUTROPHILS # BLD AUTO: 4.92 THOUSANDS/ΜL (ref 1.85–7.62)
NEUTS SEG NFR BLD AUTO: 82 % (ref 43–75)
NRBC BLD AUTO-RTO: 0 /100 WBCS
PLATELET # BLD AUTO: 110 THOUSANDS/UL (ref 149–390)
PMV BLD AUTO: 13.7 FL (ref 8.9–12.7)
POTASSIUM SERPL-SCNC: 3.8 MMOL/L (ref 3.5–5.3)
PROT SERPL-MCNC: 5.5 G/DL (ref 6.4–8.2)
RBC # BLD AUTO: 4.21 MILLION/UL (ref 3.88–5.62)
SODIUM SERPL-SCNC: 140 MMOL/L (ref 136–145)
TRANSFUSION STATUS PATIENT QL: NORMAL
WBC # BLD AUTO: 5.98 THOUSAND/UL (ref 4.31–10.16)

## 2020-12-31 PROCEDURE — 85379 FIBRIN DEGRADATION QUANT: CPT | Performed by: FAMILY MEDICINE

## 2020-12-31 PROCEDURE — 85025 COMPLETE CBC W/AUTO DIFF WBC: CPT | Performed by: FAMILY MEDICINE

## 2020-12-31 PROCEDURE — 82728 ASSAY OF FERRITIN: CPT | Performed by: FAMILY MEDICINE

## 2020-12-31 PROCEDURE — 80053 COMPREHEN METABOLIC PANEL: CPT | Performed by: FAMILY MEDICINE

## 2020-12-31 PROCEDURE — 94761 N-INVAS EAR/PLS OXIMETRY MLT: CPT

## 2020-12-31 PROCEDURE — 99239 HOSP IP/OBS DSCHRG MGMT >30: CPT | Performed by: FAMILY MEDICINE

## 2020-12-31 PROCEDURE — 86140 C-REACTIVE PROTEIN: CPT | Performed by: FAMILY MEDICINE

## 2020-12-31 RX ORDER — MELATONIN
2000 DAILY
Qty: 14 TABLET | Refills: 0 | Status: SHIPPED | OUTPATIENT
Start: 2021-01-01 | End: 2021-10-11

## 2020-12-31 RX ORDER — PREDNISONE 20 MG/1
40 TABLET ORAL DAILY
Qty: 12 TABLET | Refills: 0 | Status: SHIPPED | OUTPATIENT
Start: 2020-12-31 | End: 2021-01-06

## 2020-12-31 RX ORDER — ZINC SULFATE 50(220)MG
220 CAPSULE ORAL DAILY
Qty: 4 CAPSULE | Refills: 0 | Status: SHIPPED | OUTPATIENT
Start: 2021-01-01 | End: 2021-10-11

## 2020-12-31 RX ADMIN — FAMOTIDINE 20 MG: 20 TABLET, FILM COATED ORAL at 09:00

## 2020-12-31 RX ADMIN — REMDESIVIR 100 MG: 100 INJECTION, POWDER, LYOPHILIZED, FOR SOLUTION INTRAVENOUS at 06:30

## 2020-12-31 RX ADMIN — DOXYCYCLINE 100 MG: 100 INJECTION, POWDER, LYOPHILIZED, FOR SOLUTION INTRAVENOUS at 09:01

## 2020-12-31 RX ADMIN — ENOXAPARIN SODIUM 40 MG: 40 INJECTION SUBCUTANEOUS at 09:00

## 2020-12-31 RX ADMIN — OXYCODONE HYDROCHLORIDE AND ACETAMINOPHEN 1000 MG: 500 TABLET ORAL at 09:00

## 2020-12-31 RX ADMIN — Medication 2000 UNITS: at 09:00

## 2020-12-31 RX ADMIN — NYSTATIN 500000 UNITS: 100000 SUSPENSION ORAL at 09:00

## 2020-12-31 RX ADMIN — ZINC SULFATE 220 MG (50 MG) CAPSULE 220 MG: CAPSULE at 09:00

## 2020-12-31 RX ADMIN — NYSTATIN 500000 UNITS: 100000 SUSPENSION ORAL at 11:03

## 2020-12-31 NOTE — NURSING NOTE
Peripheral IV removed  Belongings sent with pt including home O2  AVS printed and reviewed with pt  All questions answered  Accompanied off unit in w/c with PCA

## 2020-12-31 NOTE — PLAN OF CARE
Problem: PAIN - ADULT  Goal: Verbalizes/displays adequate comfort level or baseline comfort level  Description: Interventions:  - Encourage patient to monitor pain and request assistance  - Assess pain using appropriate pain scale  - Administer analgesics based on type and severity of pain and evaluate response  - Implement non-pharmacological measures as appropriate and evaluate response  - Consider cultural and social influences on pain and pain management  - Notify physician/advanced practitioner if interventions unsuccessful or patient reports new pain  Outcome: Progressing     Problem: INFECTION - ADULT  Goal: Absence or prevention of progression during hospitalization  Description: INTERVENTIONS:  - Assess and monitor for signs and symptoms of infection  - Monitor lab/diagnostic results  - Monitor all insertion sites, i e  indwelling lines, tubes, and drains  - Monitor endotracheal if appropriate and nasal secretions for changes in amount and color  - [place] appropriate cooling/warming therapies per order  - Administer medications as ordered  - Instruct and encourage patient and family to use good hand hygiene technique  - Identify and instruct in appropriate isolation precautions for identified infection/condition  Outcome: Progressing     Problem: SAFETY ADULT  Goal: Patient will remain free of falls  Description: INTERVENTIONS:  - Assess patient frequently for physical needs  -  Identify cognitive and physical deficits and behaviors that affect risk of falls    -  [place] fall precautions as indicated by assessment   - Educate patient/family on patient safety including physical limitations  - Instruct patient to call for assistance with activity based on assessment  - Modify environment to reduce risk of injury  - Consider OT/PT consult to assist with strengthening/mobility  Outcome: Progressing  Goal: Maintain or return to baseline ADL function  Description: INTERVENTIONS:  -  Assess patient's ability to carry out ADLs; assess patient's baseline for ADL function and identify physical deficits which impact ability to perform ADLs (bathing, care of mouth/teeth, toileting, grooming, dressing, etc )  - Assess/evaluate cause of self-care deficits   - Assess range of motion  - Assess patient's mobility; develop plan if impaired  - Assess patient's need for assistive devices and provide as appropriate  - Encourage maximum independence but intervene and supervise when necessary  - Involve family in performance of ADLs  - Assess for home care needs following discharge   - Consider OT consult to assist with ADL evaluation and planning for discharge  - Provide patient education as appropriate  Outcome: Progressing  Goal: Maintain or return mobility status to optimal level  Description: INTERVENTIONS:  - Assess patient's baseline mobility status (ambulation, transfers, stairs, etc )    - Identify cognitive and physical deficits and behaviors that affect mobility  - Identify mobility aids required to assist with transfers and/or ambulation (gait belt, sit-to-stand, lift, walker, cane, etc )  - [place] fall precautions as indicated by assessment  - Record patient progress and toleration of activity level on Mobility SBAR; progress patient to next Phase/Stage  - Instruct patient to call for assistance with activity based on assessment  - Consider rehabilitation consult to assist with strengthening/weightbearing, etc   Outcome: Progressing     Problem: DISCHARGE PLANNING  Goal: Discharge to home or other facility with appropriate resources  Description: INTERVENTIONS:  - Identify barriers to discharge w/patient and caregiver  - Arrange for needed discharge resources and transportation as appropriate  - Identify discharge learning needs (meds, wound care, etc )  - Arrange for interpretive services to assist at discharge as needed  - Refer to Case Management Department for coordinating discharge planning if the patient needs post-hospital services based on physician/advanced practitioner order or complex needs related to functional status, cognitive ability, or social support system  Outcome: Progressing     Problem: Knowledge Deficit  Goal: Patient/family/caregiver demonstrates understanding of disease process, treatment plan, medications, and discharge instructions  Description: Complete learning assessment and assess knowledge base  Interventions:  - Provide teaching at level of understanding  - Provide teaching via preferred learning methods  Outcome: Progressing     Problem: Nutrition/Hydration-ADULT  Goal: Nutrient/Hydration intake appropriate for improving, restoring or maintaining nutritional needs  Description: Monitor and assess patient's nutrition/hydration status for malnutrition  Collaborate with interdisciplinary team and initiate plan and interventions as ordered  Monitor patient's weight and dietary intake as ordered or per policy  Utilize nutrition screening tool and intervene as necessary  Determine patient's food preferences and provide high-protein, high-caloric foods as appropriate       INTERVENTIONS:  - Monitor oral intake, urinary output, labs, and treatment plans  - Assess nutrition and hydration status and recommend course of action  - Evaluate amount of meals eaten  - Assist patient with eating if necessary   - Allow adequate time for meals  - Recommend/ encourage appropriate diets, oral nutritional supplements, and vitamin/mineral supplements  - Order, calculate, and assess calorie counts as needed  - Recommend, monitor, and adjust tube feedings and TPN/PPN based on assessed needs  - Assess need for intravenous fluids  - Provide specific nutrition/hydration education as appropriate  - Include patient/family/caregiver in decisions related to nutrition  Outcome: Progressing     Problem: Potential for Falls  Goal: Patient will remain free of falls  Description: INTERVENTIONS:  - Assess patient frequently for physical needs  -  Identify cognitive and physical deficits and behaviors that affect risk of falls    -  [place] fall precautions as indicated by assessment   - Educate patient/family on patient safety including physical limitations  - Instruct patient to call for assistance with activity based on assessment  - Modify environment to reduce risk of injury  - Consider OT/PT consult to assist with strengthening/mobility  Outcome: Progressing

## 2020-12-31 NOTE — RESPIRATORY THERAPY NOTE
Home Oxygen Qualifying Test       Patient name: Shahid Mensah        : 1965   Date of Test:  2020  Diagnosis:      Home Oxygen Test:    **Medicare Guidelines require item(s) 1-5 on all ambulatory patients or 1 and 2 on non-ambulatory patients  1   Baseline SPO2 on Room Air at rest 92 %  2   SPO2 during exercise on Room Air 86 %  During exercise monitor SpO2  If SPO2 increases >=89% with ambulation do not add supplemental             oxygen  If <= 88% on room air add O2 via NC and titrate patient  Patient must be ambulated with O2 and titrated to > 88% with exertion  3   SPO2 on Oxygen at rest na % na lpm     4   SPO2 during exercise on Oxygen  90% a liter flow of 4 lpm     5   Exercise performed:          walking, duration 6 (min)          [x]  Supplemental Home Oxygen is indicated  []  Client does not qualify for home oxygen        Respiratory Additional Notes- Pt desaturated to 86% while ambulating requiring 4L O2   [facility],

## 2020-12-31 NOTE — ASSESSMENT & PLAN NOTE
· Presents with cough fever fatigue sore throat- symptoms started 12/25 -   · COVID positive  · Patient had tremendous decrease his inflammatory markers even the D-dimer  Patient declines any shortness of breath he actually ambulated today to the bathroom as well  Eating he had his home O2 studies done he does not require any oxygen at rest he is going to require 4 L ambulation  Oxygen will be provided to him  He does not require any anticoagulation on discharge  He will be discharged on 6 more days of prednisone 40 mg daily and vitamin-C vitamin-D and zinc to complete a total of 7 days from the start of his treatment  Will order a chest x-ray PA and lateral repeat in 4 weeks and he has virtual visit scheduled with his primary care physician  He has remained afebrile  · Chest x-ray: Patchy bilateral infiltrates right greater than left  In the setting of clinically suspected/proven COVID-19, this plain film appearance while nonspecific, can be seen in cases of viral pneumonia such as COVID-19  · , troponin <0 02,  will trend tomorrow  · CRP 36 7, ferritin high , ddimer 1 13 -will trend tomorrow decreased  · First procalcitonin is negative at 2 were negative will discontinue antibiotics  · Mild COVID pathway  · Oxygen protocol  · Respiratory protocol  · The patient is on oxygen he is morbidly obese high risk of decompensation discussed a convalescent plasma the side effects of transfusion reaction patient agreed to receive will order  · Convalescent Plasma was ordered on 12/31/2020  The Fact Sheet for Patients and Parents/Caregivers was provided to the patient and/or healthcare agent  The option to accept or refuse administration was offered  The risks, benefits, and alternatives to treatment were reviewed, and all questions addressed  Disclosure that this treatment is authorized for use under EUA and not FDA approved for treatment was discussed    Patient actually had a transfusion reaction with fever yesterday  Now the fever resolved  · Vitamin-D, vitamin-C, zinc  · Remdesivir 3/5 with inflammatory markers and down and he is without any requirement of oxygen at rest and shortness of breath completely resolved will not complete a 5 day course but will be discharged home

## 2020-12-31 NOTE — DISCHARGE SUMMARY
Discharge- Markus Foots 1965, 63 y o  male MRN: [id number]    Unit/Bed#: -01 Encounter: [id number]    Primary Care Provider: Tony Gonzalez MD   Date and time admitted to hospital: 12/28/2020  5:26 PM        Morbid obesity ([facility] [address] )  Assessment & Plan  · Counseled on lifestyle modifications    Essential tremor  Assessment & Plan  · Essential tremor present-relates to chemical exposure while in   · Not on medication    Thrombocytopenia ([facility] [address] )  Assessment & Plan  · Platelets 98 improved  · Secondary to coronavirus    Hypokalemia  Assessment & Plan  · Resolved    Class 3 severe obesity due to excess calories without serious comorbidity with body mass index (BMI) of 40 0 to 44 9 in [facility])  Assessment & Plan  · Encourage therapeutic lifestyle modification    * Pneumonia due to COVID-19 virus  Assessment & Plan  · Presents with cough fever fatigue sore throat- symptoms started 12/25 -   · COVID positive  · Patient had tremendous decrease his inflammatory markers even the D-dimer  Patient declines any shortness of breath he actually ambulated today to the bathroom as well  Eating he had his home O2 studies done he does not require any oxygen at rest he is going to require 4 L ambulation  Oxygen will be provided to him  He does not require any anticoagulation on discharge  He will be discharged on 6 more days of prednisone 40 mg daily and vitamin-C vitamin-D and zinc to complete a total of 7 days from the start of his treatment  Will order a chest x-ray PA and lateral repeat in 4 weeks and he has virtual visit scheduled with his primary care physician  He has remained afebrile  · Chest x-ray: Patchy bilateral infiltrates right greater than left  In the setting of clinically suspected/proven COVID-19, this plain film appearance while nonspecific, can be seen in cases of viral pneumonia such as COVID-19     · , troponin <0 02,  will trend tomorrow  · CRP 36 7, ferritin high , ddimer 1 13 -will trend tomorrow decreased  · First procalcitonin is negative at 2 were negative will discontinue antibiotics  · Mild COVID pathway  · Oxygen protocol  · Respiratory protocol  · The patient is on oxygen he is morbidly obese high risk of decompensation discussed a convalescent plasma the side effects of transfusion reaction patient agreed to receive will order  · Convalescent Plasma was ordered on 12/31/2020  The Fact Sheet for Patients and Parents/Caregivers was provided to the patient and/or healthcare agent  The option to accept or refuse administration was offered  The risks, benefits, and alternatives to treatment were reviewed, and all questions addressed  Disclosure that this treatment is authorized for use under EUA and not FDA approved for treatment was discussed  Patient actually had a transfusion reaction with fever yesterday  Now the fever resolved  · Vitamin-D, vitamin-C, zinc  · Remdesivir 3/5 with inflammatory markers and down and he is without any requirement of oxygen at rest and shortness of breath completely resolved will not complete a 5 day course but will be discharged home  Discharging Physician / Practitioner: Wyatt Morales MD  PCP: Paco Perez MD  Admission Date:   Admission Orders (From admission, onward)     Ordered        12/28/20 1936  Inpatient Admission  Once                   Discharge Date: 12/31/20    Resolved Problems  Date Reviewed: 12/31/2020    None          Consultations During Hospital Stay:  · None    Procedures Performed:   · None    Significant Findings / Test Results:   · Chest x-ray-Patchy bilateral infiltrates right greater than left  In the setting of clinically suspected/proven COVID-19, this plain film appearance while nonspecific, can be seen in cases of viral pneumonia such as COVID-19  Incidental Findings:   · None     Test Results Pending at Discharge (will require follow up):    · None     Outpatient Tests Requested:  · Chest x-ray in 4 weeks    Complications:  None    Reason for Admission:  Cough and myalgias    Hospital Course:     Markus Titus is a 63 y o  male patient who originally presented to the hospital on 12/28/2020 due to cough and myalgias was found to have coronavirus pneumonia initially required 1 L at rest   Patient's inflammatory markers were elevated he was started on steroids he was started on vitamin-C vitamin-D and also secondary to morbid obesity and high rate of decompensation he was started on remdesivir  Patient's symptoms resolved of on discharge she did not have any more shortness of breath or cough he was eating without any diarrhea  Inflammatory markers decreased significantly even the D-dimer  Patient completed 3 days of remdesivir this time he does not need a full 5 days he did have home O2 studies done he did not require any oxygen at rest he just required 4 L ambulation  He will be discharged on prednisone for 6 more days 40 mg daily vitamin-C vitamin-D zinc to complete the rest of the 7 days  He at this time he does not need any anticoagulation  He will need to quarantine to January 15  Chest x-ray to repeat in 4 weeks  Please see above list of diagnoses and related plan for additional information  Condition at Discharge: stable     Discharge Day Visit / Exam:     Subjective:  Patient seen and examined denies any chest pain or shortness of breath no diarrhea  Vitals: Blood Pressure: 140/83 (12/31/20 0727)  Pulse: 68 (12/31/20 0727)  Temperature: (!) 97 4 °F (36 3 °C) (12/31/20 0727)  Temp Source: Oral (12/29/20 1815)  Respirations: 20 (12/31/20 0727)  Height: 5' 9" (175 3 cm) (12/28/20 2013)  Weight - Scale: 132 kg (290 lb) (12/28/20 2013)  SpO2: 96 % (12/31/20 0727)  Exam:   Physical Exam  Constitutional:       Appearance: He is obese  HENT:      Head: Normocephalic and atraumatic  Eyes:      Extraocular Movements: Extraocular movements intact        Pupils: Pupils are equal, round, and reactive to light  Cardiovascular:      Rate and Rhythm: Normal rate and regular rhythm  Pulmonary:      Effort: Pulmonary effort is normal  No respiratory distress  Breath sounds: No wheezing or rales  Abdominal:      General: Abdomen is flat  Bowel sounds are normal  There is no distension  Palpations: Abdomen is soft  Tenderness: There is no abdominal tenderness  Musculoskeletal:         General: No swelling  Neurological:      General: No focal deficit present  Mental Status: He is oriented to person, place, and time  Psychiatric:         Mood and Affect: Mood normal          Discussion with Family:  Wife    Discharge instructions/Information to patient and family:   See after visit summary for information provided to patient and family  Provisions for Follow-Up Care:  See after visit summary for information related to follow-up care and any pertinent home health orders  Disposition:     Home    For Discharges to [facility] SNF:   · Not Applicable to this Patient - Not Applicable to this Patient    Planned Readmission: no     Discharge Statement:  I spent >35 minutes discharging the patient  This time was spent on the day of discharge  I had direct contact with the patient on the day of discharge  Greater than 50% of the total time was spent examining patient, answering all patient questions, arranging and discussing plan of care with patient as well as directly providing post-discharge instructions  Additional time then spent on discharge activities  Discharge Medications:  See after visit summary for reconciled discharge medications provided to patient and family        ** Please Note: This note has been constructed using a voice recognition system **

## 2020-12-31 NOTE — PLAN OF CARE
Problem: PAIN - ADULT  Goal: Verbalizes/displays adequate comfort level or baseline comfort level  Description: Interventions:  - Encourage patient to monitor pain and request assistance  - Assess pain using appropriate pain scale  - Administer analgesics based on type and severity of pain and evaluate response  - Implement non-pharmacological measures as appropriate and evaluate response  - Consider cultural and social influences on pain and pain management  - Notify physician/advanced practitioner if interventions unsuccessful or patient reports new pain  Outcome: Progressing     Problem: INFECTION - ADULT  Goal: Absence or prevention of progression during hospitalization  Description: INTERVENTIONS:  - Assess and monitor for signs and symptoms of infection  - Monitor lab/diagnostic results  - Monitor all insertion sites, i e  indwelling lines, tubes, and drains  - Monitor endotracheal if appropriate and nasal secretions for changes in amount and color  - [place] appropriate cooling/warming therapies per order  - Administer medications as ordered  - Instruct and encourage patient and family to use good hand hygiene technique  - Identify and instruct in appropriate isolation precautions for identified infection/condition  Outcome: Progressing     Problem: SAFETY ADULT  Goal: Patient will remain free of falls  Description: INTERVENTIONS:  - Assess patient frequently for physical needs  -  Identify cognitive and physical deficits and behaviors that affect risk of falls    -  [place] fall precautions as indicated by assessment   - Educate patient/family on patient safety including physical limitations  - Instruct patient to call for assistance with activity based on assessment  - Modify environment to reduce risk of injury  - Consider OT/PT consult to assist with strengthening/mobility  Outcome: Progressing  Goal: Maintain or return to baseline ADL function  Description: INTERVENTIONS:  -  Assess patient's ability to carry out ADLs; assess patient's baseline for ADL function and identify physical deficits which impact ability to perform ADLs (bathing, care of mouth/teeth, toileting, grooming, dressing, etc )  - Assess/evaluate cause of self-care deficits   - Assess range of motion  - Assess patient's mobility; develop plan if impaired  - Assess patient's need for assistive devices and provide as appropriate  - Encourage maximum independence but intervene and supervise when necessary  - Involve family in performance of ADLs  - Assess for home care needs following discharge   - Consider OT consult to assist with ADL evaluation and planning for discharge  - Provide patient education as appropriate  Outcome: Progressing  Goal: Maintain or return mobility status to optimal level  Description: INTERVENTIONS:  - Assess patient's baseline mobility status (ambulation, transfers, stairs, etc )    - Identify cognitive and physical deficits and behaviors that affect mobility  - Identify mobility aids required to assist with transfers and/or ambulation (gait belt, sit-to-stand, lift, walker, cane, etc )  - [place] fall precautions as indicated by assessment  - Record patient progress and toleration of activity level on Mobility SBAR; progress patient to next Phase/Stage  - Instruct patient to call for assistance with activity based on assessment  - Consider rehabilitation consult to assist with strengthening/weightbearing, etc   Outcome: Progressing     Problem: DISCHARGE PLANNING  Goal: Discharge to home or other facility with appropriate resources  Description: INTERVENTIONS:  - Identify barriers to discharge w/patient and caregiver  - Arrange for needed discharge resources and transportation as appropriate  - Identify discharge learning needs (meds, wound care, etc )  - Arrange for interpretive services to assist at discharge as needed  - Refer to Case Management Department for coordinating discharge planning if the patient needs post-hospital services based on physician/advanced practitioner order or complex needs related to functional status, cognitive ability, or social support system  Outcome: Progressing     Problem: Knowledge Deficit  Goal: Patient/family/caregiver demonstrates understanding of disease process, treatment plan, medications, and discharge instructions  Description: Complete learning assessment and assess knowledge base  Interventions:  - Provide teaching at level of understanding  - Provide teaching via preferred learning methods  Outcome: Progressing     Problem: Nutrition/Hydration-ADULT  Goal: Nutrient/Hydration intake appropriate for improving, restoring or maintaining nutritional needs  Description: Monitor and assess patient's nutrition/hydration status for malnutrition  Collaborate with interdisciplinary team and initiate plan and interventions as ordered  Monitor patient's weight and dietary intake as ordered or per policy  Utilize nutrition screening tool and intervene as necessary  Determine patient's food preferences and provide high-protein, high-caloric foods as appropriate       INTERVENTIONS:  - Monitor oral intake, urinary output, labs, and treatment plans  - Assess nutrition and hydration status and recommend course of action  - Evaluate amount of meals eaten  - Assist patient with eating if necessary   - Allow adequate time for meals  - Recommend/ encourage appropriate diets, oral nutritional supplements, and vitamin/mineral supplements  - Order, calculate, and assess calorie counts as needed  - Recommend, monitor, and adjust tube feedings and TPN/PPN based on assessed needs  - Assess need for intravenous fluids  - Provide specific nutrition/hydration education as appropriate  - Include patient/family/caregiver in decisions related to nutrition  Outcome: Progressing     Problem: Potential for Falls  Goal: Patient will remain free of falls  Description: INTERVENTIONS:  - Assess patient frequently for physical needs  -  Identify cognitive and physical deficits and behaviors that affect risk of falls    -  [place] fall precautions as indicated by assessment   - Educate patient/family on patient safety including physical limitations  - Instruct patient to call for assistance with activity based on assessment  - Modify environment to reduce risk of injury  - Consider OT/PT consult to assist with strengthening/mobility  Outcome: Progressing

## 2020-12-31 NOTE — ASSESSMENT & PLAN NOTE
· Essential tremor present-relates to chemical exposure while in [employer]  · Not on medication

## 2020-12-31 NOTE — SOCIAL WORK
Current LOS 2 days  STEVEN completed chart review  Pt case discussed with attending Dr Jose Rafael Alvarez  Pt admitted with pneumonia d/t COVID  Pt remains stable of 1L NC  Pt having home O2 evaluation today  Pt will be d/c home after evaluation  CM contacted pt via telephone in room to discuss d/c planning  CM discussed with pt PCP f/u call  CM advised pt that he will need to contact the office to schedule a virtual visit to reestablish care  All information added to AVS      Pt requires 4L NC O2 on exertion  [facility] referral to [facility]  Anticipating that pt tanks will be delivered to room by 2:00pm      CM discussed with pt his transportation home  Pt states he has a friend that will pick him up  CM will continue to follow pt care and remain available until d/c

## 2021-01-08 NOTE — UTILIZATION REVIEW
Notification of Discharge  This is a Notification of Discharge from our facility [address]  Please be advised that this patient has been discharge from our facility  Below you will find the admission and discharge date and time including the patients disposition  PRESENTATION DATE: 12/28/2020  5:26 PM  OBS ADMISSION DATE:   IP ADMISSION DATE: 12/28/20 1936   DISCHARGE DATE: 12/31/2020  3:20 PM  DISPOSITION: Home/Self Care Home/Self Care   Admission Orders listed below:  Admission Orders (From admission, onward)     Ordered        12/28/20 1936  Inpatient Admission  Once                   Please contact the UR Department if additional information is required to close this patient's authorization/case  [address] Utilization Review Department  Main: [phone] x carefully listen to the prompts  All voicemails are confidential   [email or web address]  Send all requests for admission clinical reviews, approved or denied determinations and any other requests to dedicated fax number below belonging to the campus where the patient is receiving treatment   List of dedicated fax numbers:  [address] [place] [address] DENIALS (Administrative/Medical Necessity) [phone]   [address]  (Maternity/NICU/Pediatrics) [phone] [address] [facility] [phone]     [address]  [phone]   Charla Winn [phone]   [facility] [address] [facility] [phone]   [facility]  [phone]   [address] [facility] [address]  [address] [facility] [address] [facility] [phone]

## 2021-10-11 ENCOUNTER — APPOINTMENT (EMERGENCY)
Dept: NON INVASIVE DIAGNOSTICS | Facility: HOSPITAL | Age: 56
End: 2021-10-11
Payer: COMMERCIAL

## 2021-10-11 ENCOUNTER — HOSPITAL ENCOUNTER (EMERGENCY)
Facility: HOSPITAL | Age: 56
Discharge: HOME | End: 2021-10-11
Attending: EMERGENCY MEDICINE
Payer: COMMERCIAL

## 2021-10-11 VITALS
OXYGEN SATURATION: 98 % | SYSTOLIC BLOOD PRESSURE: 148 MMHG | BODY MASS INDEX: 41.52 KG/M2 | RESPIRATION RATE: 18 BRPM | WEIGHT: 290 LBS | TEMPERATURE: 97.6 F | DIASTOLIC BLOOD PRESSURE: 89 MMHG | HEART RATE: 88 BPM | HEIGHT: 70 IN

## 2021-10-11 DIAGNOSIS — L03.115 CELLULITIS OF RIGHT LOWER EXTREMITY: Primary | ICD-10-CM

## 2021-10-11 DIAGNOSIS — M79.604 RIGHT LEG PAIN: ICD-10-CM

## 2021-10-11 LAB
ALBUMIN SERPL BCP-MCNC: 4 G/DL (ref 3.5–5)
ALP SERPL-CCNC: 95 U/L (ref 46–116)
ALT SERPL W P-5'-P-CCNC: 29 U/L (ref 12–78)
ANION GAP SERPL CALCULATED.3IONS-SCNC: 5 MMOL/L (ref 4–13)
APTT PPP: 32 SECONDS (ref 23–37)
AST SERPL W P-5'-P-CCNC: 17 U/L (ref 5–45)
BASOPHILS # BLD AUTO: 0.03 THOUSANDS/ΜL (ref 0–0.1)
BASOPHILS NFR BLD AUTO: 0 % (ref 0–1)
BILIRUB SERPL-MCNC: 1.71 MG/DL (ref 0.2–1)
BUN SERPL-MCNC: 10 MG/DL (ref 5–25)
CALCIUM SERPL-MCNC: 8.9 MG/DL (ref 8.3–10.1)
CHLORIDE SERPL-SCNC: 102 MMOL/L (ref 100–108)
CO2 SERPL-SCNC: 30 MMOL/L (ref 21–32)
CREAT SERPL-MCNC: 0.94 MG/DL (ref 0.6–1.3)
D DIMER PPP FEU-MCNC: 0.47 UG/ML FEU
EOSINOPHIL # BLD AUTO: 0.05 THOUSAND/ΜL (ref 0–0.61)
EOSINOPHIL NFR BLD AUTO: 0 % (ref 0–6)
ERYTHROCYTE [DISTWIDTH] IN BLOOD BY AUTOMATED COUNT: 13.7 % (ref 11.6–15.1)
ERYTHROCYTE [SEDIMENTATION RATE] IN BLOOD: 9 MM/HOUR (ref 0–19)
GFR SERPL CREATININE-BSD FRML MDRD: 90 ML/MIN/1.73SQ M
GLUCOSE SERPL-MCNC: 99 MG/DL (ref 65–140)
HCT VFR BLD AUTO: 40.1 % (ref 36.5–49.3)
HGB BLD-MCNC: 13.4 G/DL (ref 12–17)
IMM GRANULOCYTES # BLD AUTO: 0.08 THOUSAND/UL (ref 0–0.2)
IMM GRANULOCYTES NFR BLD AUTO: 1 % (ref 0–2)
INR PPP: 1.05 (ref 0.84–1.19)
LYMPHOCYTES # BLD AUTO: 1 THOUSANDS/ΜL (ref 0.6–4.47)
LYMPHOCYTES NFR BLD AUTO: 7 % (ref 14–44)
MCH RBC QN AUTO: 28.7 PG (ref 26.8–34.3)
MCHC RBC AUTO-ENTMCNC: 33.4 G/DL (ref 31.4–37.4)
MCV RBC AUTO: 86 FL (ref 82–98)
MONOCYTES # BLD AUTO: 0.92 THOUSAND/ΜL (ref 0.17–1.22)
MONOCYTES NFR BLD AUTO: 7 % (ref 4–12)
NEUTROPHILS # BLD AUTO: 11.74 THOUSANDS/ΜL (ref 1.85–7.62)
NEUTS SEG NFR BLD AUTO: 85 % (ref 43–75)
NRBC BLD AUTO-RTO: 0 /100 WBCS
PLATELET # BLD AUTO: 153 THOUSANDS/UL (ref 149–390)
PMV BLD AUTO: 12.6 FL (ref 8.9–12.7)
POTASSIUM SERPL-SCNC: 4 MMOL/L (ref 3.5–5.3)
PROT SERPL-MCNC: 6.6 G/DL (ref 6.4–8.2)
PROTHROMBIN TIME: 13.6 SECONDS (ref 11.6–14.5)
RBC # BLD AUTO: 4.67 MILLION/UL (ref 3.88–5.62)
SODIUM SERPL-SCNC: 137 MMOL/L (ref 136–145)
WBC # BLD AUTO: 13.82 THOUSAND/UL (ref 4.31–10.16)

## 2021-10-11 PROCEDURE — 87040 BLOOD CULTURE FOR BACTERIA: CPT | Performed by: EMERGENCY MEDICINE

## 2021-10-11 PROCEDURE — 85379 FIBRIN DEGRADATION QUANT: CPT | Performed by: EMERGENCY MEDICINE

## 2021-10-11 PROCEDURE — 85652 RBC SED RATE AUTOMATED: CPT | Performed by: EMERGENCY MEDICINE

## 2021-10-11 PROCEDURE — 99284 EMERGENCY DEPT VISIT MOD MDM: CPT

## 2021-10-11 PROCEDURE — 85610 PROTHROMBIN TIME: CPT | Performed by: EMERGENCY MEDICINE

## 2021-10-11 PROCEDURE — 93971 EXTREMITY STUDY: CPT | Performed by: SURGERY

## 2021-10-11 PROCEDURE — 85730 THROMBOPLASTIN TIME PARTIAL: CPT | Performed by: EMERGENCY MEDICINE

## 2021-10-11 PROCEDURE — 93971 EXTREMITY STUDY: CPT

## 2021-10-11 PROCEDURE — 80053 COMPREHEN METABOLIC PANEL: CPT | Performed by: EMERGENCY MEDICINE

## 2021-10-11 PROCEDURE — 36415 COLL VENOUS BLD VENIPUNCTURE: CPT | Performed by: EMERGENCY MEDICINE

## 2021-10-11 PROCEDURE — 85025 COMPLETE CBC W/AUTO DIFF WBC: CPT | Performed by: EMERGENCY MEDICINE

## 2021-10-11 PROCEDURE — 99284 EMERGENCY DEPT VISIT MOD MDM: CPT | Performed by: EMERGENCY MEDICINE

## 2021-10-11 RX ORDER — CEPHALEXIN 500 MG/1
500 CAPSULE ORAL EVERY 6 HOURS SCHEDULED
Qty: 40 CAPSULE | Refills: 0 | Status: SHIPPED | OUTPATIENT
Start: 2021-10-11 | End: 2021-10-21

## 2021-10-11 RX ORDER — CEPHALEXIN 250 MG/1
500 CAPSULE ORAL ONCE
Status: COMPLETED | OUTPATIENT
Start: 2021-10-11 | End: 2021-10-11

## 2021-10-11 RX ADMIN — CEPHALEXIN 500 MG: 250 CAPSULE ORAL at 15:46

## 2021-10-17 LAB
BACTERIA BLD CULT: NORMAL
BACTERIA BLD CULT: NORMAL

## 2022-10-12 PROBLEM — U07.1 PNEUMONIA DUE TO COVID-19 VIRUS: Status: RESOLVED | Noted: 2020-12-28 | Resolved: 2022-10-12

## 2022-10-12 PROBLEM — J12.82 PNEUMONIA DUE TO COVID-19 VIRUS: Status: RESOLVED | Noted: 2020-12-28 | Resolved: 2022-10-12

## 2025-05-30 ENCOUNTER — HOSPITAL ENCOUNTER (INPATIENT)
Facility: HOSPITAL | Age: 60
LOS: 2 days | Discharge: HOME | DRG: 720 | End: 2025-06-01
Attending: EMERGENCY MEDICINE | Admitting: FAMILY MEDICINE
Payer: COMMERCIAL

## 2025-05-30 ENCOUNTER — APPOINTMENT (EMERGENCY)
Dept: CT IMAGING | Facility: HOSPITAL | Age: 60
DRG: 720 | End: 2025-05-30
Payer: COMMERCIAL

## 2025-05-30 DIAGNOSIS — A41.9 SEPSIS (HCC): ICD-10-CM

## 2025-05-30 DIAGNOSIS — R06.02 SOB (SHORTNESS OF BREATH): ICD-10-CM

## 2025-05-30 DIAGNOSIS — J18.9 PNEUMONIA: Primary | ICD-10-CM

## 2025-05-30 LAB
2HR DELTA HS TROPONIN: 1 NG/L
ALBUMIN SERPL BCG-MCNC: 3.7 G/DL (ref 3.5–5)
ALP SERPL-CCNC: 271 U/L (ref 34–104)
ALT SERPL W P-5'-P-CCNC: 70 U/L (ref 7–52)
ANION GAP SERPL CALCULATED.3IONS-SCNC: 8 MMOL/L (ref 4–13)
APTT PPP: 35 SECONDS (ref 23–34)
AST SERPL W P-5'-P-CCNC: 74 U/L (ref 13–39)
B-OH-BUTYR SERPL-MCNC: <0.05 MMOL/L (ref 0.2–0.6)
BACTERIA UR QL AUTO: ABNORMAL /HPF
BASE EX.OXY STD BLDV CALC-SCNC: 57.4 % (ref 60–80)
BASE EXCESS BLDV CALC-SCNC: 1.5 MMOL/L
BASOPHILS # BLD AUTO: 0.04 THOUSANDS/ÂΜL (ref 0–0.1)
BASOPHILS NFR BLD AUTO: 0 % (ref 0–1)
BILIRUB SERPL-MCNC: 1.33 MG/DL (ref 0.2–1)
BILIRUB UR QL STRIP: ABNORMAL
BNP SERPL-MCNC: 98 PG/ML (ref 0–100)
BUN SERPL-MCNC: 12 MG/DL (ref 5–25)
CALCIUM SERPL-MCNC: 8.9 MG/DL (ref 8.4–10.2)
CARDIAC TROPONIN I PNL SERPL HS: 14 NG/L (ref ?–50)
CARDIAC TROPONIN I PNL SERPL HS: 15 NG/L (ref ?–50)
CHLORIDE SERPL-SCNC: 100 MMOL/L (ref 96–108)
CLARITY UR: CLEAR
CO2 SERPL-SCNC: 28 MMOL/L (ref 21–32)
COLOR UR: ABNORMAL
CREAT SERPL-MCNC: 0.85 MG/DL (ref 0.6–1.3)
EOSINOPHIL # BLD AUTO: 0.04 THOUSAND/ÂΜL (ref 0–0.61)
EOSINOPHIL NFR BLD AUTO: 0 % (ref 0–6)
ERYTHROCYTE [DISTWIDTH] IN BLOOD BY AUTOMATED COUNT: 14.4 % (ref 11.6–15.1)
GFR SERPL CREATININE-BSD FRML MDRD: 94 ML/MIN/1.73SQ M
GLUCOSE SERPL-MCNC: 126 MG/DL (ref 65–140)
GLUCOSE UR STRIP-MCNC: ABNORMAL MG/DL
HCO3 BLDV-SCNC: 25.5 MMOL/L (ref 24–30)
HCT VFR BLD AUTO: 35.2 % (ref 36.5–49.3)
HGB BLD-MCNC: 11.1 G/DL (ref 12–17)
HGB UR QL STRIP.AUTO: ABNORMAL
IMM GRANULOCYTES # BLD AUTO: 0.17 THOUSAND/UL (ref 0–0.2)
IMM GRANULOCYTES NFR BLD AUTO: 1 % (ref 0–2)
INR PPP: 1.34 (ref 0.85–1.19)
KETONES UR STRIP-MCNC: ABNORMAL MG/DL
LACTATE SERPL-SCNC: 1.5 MMOL/L (ref 0.5–2)
LEUKOCYTE ESTERASE UR QL STRIP: NEGATIVE
LYMPHOCYTES # BLD AUTO: 0.75 THOUSANDS/ÂΜL (ref 0.6–4.47)
LYMPHOCYTES NFR BLD AUTO: 3 % (ref 14–44)
MAGNESIUM SERPL-MCNC: 1.9 MG/DL (ref 1.9–2.7)
MCH RBC QN AUTO: 27.5 PG (ref 26.8–34.3)
MCHC RBC AUTO-ENTMCNC: 31.5 G/DL (ref 31.4–37.4)
MCV RBC AUTO: 87 FL (ref 82–98)
MONOCYTES # BLD AUTO: 1.31 THOUSAND/ÂΜL (ref 0.17–1.22)
MONOCYTES NFR BLD AUTO: 6 % (ref 4–12)
MUCOUS THREADS UR QL AUTO: ABNORMAL
NEUTROPHILS # BLD AUTO: 21.34 THOUSANDS/ÂΜL (ref 1.85–7.62)
NEUTS SEG NFR BLD AUTO: 90 % (ref 43–75)
NITRITE UR QL STRIP: NEGATIVE
NON-SQ EPI CELLS URNS QL MICRO: ABNORMAL /HPF
NRBC BLD AUTO-RTO: 0 /100 WBCS
O2 CT BLDV-SCNC: 9.2 ML/DL
PCO2 BLDV: 38.1 MM HG (ref 42–50)
PH BLDV: 7.44 [PH] (ref 7.3–7.4)
PH UR STRIP.AUTO: 5.5 [PH]
PLATELET # BLD AUTO: 261 THOUSANDS/UL (ref 149–390)
PLATELET # BLD AUTO: 329 THOUSANDS/UL (ref 149–390)
PMV BLD AUTO: 11.9 FL (ref 8.9–12.7)
PMV BLD AUTO: 12.5 FL (ref 8.9–12.7)
PO2 BLDV: 30.4 MM HG (ref 35–45)
POTASSIUM SERPL-SCNC: 4 MMOL/L (ref 3.5–5.3)
PROCALCITONIN SERPL-MCNC: 0.46 NG/ML
PROT SERPL-MCNC: 6.4 G/DL (ref 6.4–8.4)
PROT UR STRIP-MCNC: ABNORMAL MG/DL
PROTHROMBIN TIME: 16.9 SECONDS (ref 12.3–15)
RBC # BLD AUTO: 4.04 MILLION/UL (ref 3.88–5.62)
RBC #/AREA URNS AUTO: ABNORMAL /HPF
SARS-COV-2 RNA RESP QL NAA+PROBE: NEGATIVE
SODIUM SERPL-SCNC: 136 MMOL/L (ref 135–147)
SP GR UR STRIP.AUTO: 1.02 (ref 1–1.03)
UROBILINOGEN UR QL STRIP.AUTO: >=8 E.U./DL
WBC # BLD AUTO: 23.65 THOUSAND/UL (ref 4.31–10.16)
WBC #/AREA URNS AUTO: ABNORMAL /HPF

## 2025-05-30 PROCEDURE — 85049 AUTOMATED PLATELET COUNT: CPT | Performed by: FAMILY MEDICINE

## 2025-05-30 PROCEDURE — 83880 ASSAY OF NATRIURETIC PEPTIDE: CPT | Performed by: PHYSICIAN ASSISTANT

## 2025-05-30 PROCEDURE — 74177 CT ABD & PELVIS W/CONTRAST: CPT

## 2025-05-30 PROCEDURE — 82805 BLOOD GASES W/O2 SATURATION: CPT | Performed by: PHYSICIAN ASSISTANT

## 2025-05-30 PROCEDURE — 71275 CT ANGIOGRAPHY CHEST: CPT

## 2025-05-30 PROCEDURE — 80053 COMPREHEN METABOLIC PANEL: CPT | Performed by: PHYSICIAN ASSISTANT

## 2025-05-30 PROCEDURE — 99222 1ST HOSP IP/OBS MODERATE 55: CPT | Performed by: FAMILY MEDICINE

## 2025-05-30 PROCEDURE — 96365 THER/PROPH/DIAG IV INF INIT: CPT

## 2025-05-30 PROCEDURE — 81001 URINALYSIS AUTO W/SCOPE: CPT | Performed by: PHYSICIAN ASSISTANT

## 2025-05-30 PROCEDURE — 87449 NOS EACH ORGANISM AG IA: CPT | Performed by: FAMILY MEDICINE

## 2025-05-30 PROCEDURE — 99285 EMERGENCY DEPT VISIT HI MDM: CPT

## 2025-05-30 PROCEDURE — 83605 ASSAY OF LACTIC ACID: CPT | Performed by: PHYSICIAN ASSISTANT

## 2025-05-30 PROCEDURE — 87635 SARS-COV-2 COVID-19 AMP PRB: CPT | Performed by: FAMILY MEDICINE

## 2025-05-30 PROCEDURE — 85730 THROMBOPLASTIN TIME PARTIAL: CPT | Performed by: PHYSICIAN ASSISTANT

## 2025-05-30 PROCEDURE — 36415 COLL VENOUS BLD VENIPUNCTURE: CPT | Performed by: PHYSICIAN ASSISTANT

## 2025-05-30 PROCEDURE — 84484 ASSAY OF TROPONIN QUANT: CPT | Performed by: PHYSICIAN ASSISTANT

## 2025-05-30 PROCEDURE — 87205 SMEAR GRAM STAIN: CPT | Performed by: FAMILY MEDICINE

## 2025-05-30 PROCEDURE — 93005 ELECTROCARDIOGRAM TRACING: CPT

## 2025-05-30 PROCEDURE — 84145 PROCALCITONIN (PCT): CPT | Performed by: PHYSICIAN ASSISTANT

## 2025-05-30 PROCEDURE — 87070 CULTURE OTHR SPECIMN AEROBIC: CPT | Performed by: FAMILY MEDICINE

## 2025-05-30 PROCEDURE — 85610 PROTHROMBIN TIME: CPT | Performed by: PHYSICIAN ASSISTANT

## 2025-05-30 PROCEDURE — 96361 HYDRATE IV INFUSION ADD-ON: CPT

## 2025-05-30 PROCEDURE — 83735 ASSAY OF MAGNESIUM: CPT | Performed by: PHYSICIAN ASSISTANT

## 2025-05-30 PROCEDURE — 87040 BLOOD CULTURE FOR BACTERIA: CPT | Performed by: PHYSICIAN ASSISTANT

## 2025-05-30 PROCEDURE — 99285 EMERGENCY DEPT VISIT HI MDM: CPT | Performed by: PHYSICIAN ASSISTANT

## 2025-05-30 PROCEDURE — 82010 KETONE BODYS QUAN: CPT | Performed by: PHYSICIAN ASSISTANT

## 2025-05-30 PROCEDURE — 85025 COMPLETE CBC W/AUTO DIFF WBC: CPT | Performed by: PHYSICIAN ASSISTANT

## 2025-05-30 RX ORDER — CEFTRIAXONE 1 G/50ML
1000 INJECTION, SOLUTION INTRAVENOUS EVERY 24 HOURS
Status: DISCONTINUED | OUTPATIENT
Start: 2025-05-30 | End: 2025-05-30

## 2025-05-30 RX ORDER — ENOXAPARIN SODIUM 100 MG/ML
40 INJECTION SUBCUTANEOUS DAILY
Status: DISCONTINUED | OUTPATIENT
Start: 2025-05-31 | End: 2025-05-30

## 2025-05-30 RX ORDER — ACETAMINOPHEN 325 MG/1
975 TABLET ORAL ONCE
Status: COMPLETED | OUTPATIENT
Start: 2025-05-30 | End: 2025-05-30

## 2025-05-30 RX ORDER — HYDROMORPHONE HCL/PF 1 MG/ML
0.5 SYRINGE (ML) INJECTION EVERY 2 HOUR PRN
Refills: 0 | Status: DISCONTINUED | OUTPATIENT
Start: 2025-05-30 | End: 2025-06-01 | Stop reason: HOSPADM

## 2025-05-30 RX ORDER — HYDRALAZINE HYDROCHLORIDE 20 MG/ML
10 INJECTION INTRAMUSCULAR; INTRAVENOUS EVERY 6 HOURS PRN
Status: DISCONTINUED | OUTPATIENT
Start: 2025-05-30 | End: 2025-06-01 | Stop reason: HOSPADM

## 2025-05-30 RX ORDER — SODIUM CHLORIDE, SODIUM LACTATE, POTASSIUM CHLORIDE, CALCIUM CHLORIDE 600; 310; 30; 20 MG/100ML; MG/100ML; MG/100ML; MG/100ML
150 INJECTION, SOLUTION INTRAVENOUS CONTINUOUS
Status: DISCONTINUED | OUTPATIENT
Start: 2025-05-30 | End: 2025-05-31

## 2025-05-30 RX ORDER — FUROSEMIDE 10 MG/ML
40 INJECTION INTRAMUSCULAR; INTRAVENOUS ONCE
Status: DISCONTINUED | OUTPATIENT
Start: 2025-05-30 | End: 2025-05-30

## 2025-05-30 RX ORDER — ACETAMINOPHEN 325 MG/1
650 TABLET ORAL EVERY 6 HOURS PRN
Status: DISCONTINUED | OUTPATIENT
Start: 2025-05-30 | End: 2025-06-01 | Stop reason: HOSPADM

## 2025-05-30 RX ORDER — GUAIFENESIN/DEXTROMETHORPHAN 100-10MG/5
10 SYRUP ORAL EVERY 4 HOURS PRN
Status: DISCONTINUED | OUTPATIENT
Start: 2025-05-30 | End: 2025-06-01 | Stop reason: HOSPADM

## 2025-05-30 RX ORDER — CEFTRIAXONE 2 G/50ML
2000 INJECTION, SOLUTION INTRAVENOUS EVERY 24 HOURS
Status: DISCONTINUED | OUTPATIENT
Start: 2025-05-30 | End: 2025-06-01 | Stop reason: HOSPADM

## 2025-05-30 RX ORDER — OXYCODONE HYDROCHLORIDE 10 MG/1
10 TABLET ORAL EVERY 4 HOURS PRN
Refills: 0 | Status: DISCONTINUED | OUTPATIENT
Start: 2025-05-30 | End: 2025-06-01 | Stop reason: HOSPADM

## 2025-05-30 RX ORDER — OXYCODONE HYDROCHLORIDE 5 MG/1
5 TABLET ORAL EVERY 4 HOURS PRN
Refills: 0 | Status: DISCONTINUED | OUTPATIENT
Start: 2025-05-30 | End: 2025-06-01 | Stop reason: HOSPADM

## 2025-05-30 RX ORDER — ENOXAPARIN SODIUM 100 MG/ML
40 INJECTION SUBCUTANEOUS EVERY 12 HOURS SCHEDULED
Status: DISCONTINUED | OUTPATIENT
Start: 2025-05-30 | End: 2025-06-01 | Stop reason: HOSPADM

## 2025-05-30 RX ORDER — ALBUTEROL SULFATE 0.83 MG/ML
2.5 SOLUTION RESPIRATORY (INHALATION) EVERY 6 HOURS PRN
Status: DISCONTINUED | OUTPATIENT
Start: 2025-05-30 | End: 2025-06-01 | Stop reason: HOSPADM

## 2025-05-30 RX ADMIN — SODIUM CHLORIDE 2190 ML: 0.9 INJECTION, SOLUTION INTRAVENOUS at 16:30

## 2025-05-30 RX ADMIN — CEFTRIAXONE 2000 MG: 2 INJECTION, SOLUTION INTRAVENOUS at 18:36

## 2025-05-30 RX ADMIN — GUAIFENESIN AND DEXTROMETHORPHAN 10 ML: 100; 10 SYRUP ORAL at 18:46

## 2025-05-30 RX ADMIN — SODIUM CHLORIDE, SODIUM LACTATE, POTASSIUM CHLORIDE, AND CALCIUM CHLORIDE 150 ML/HR: .6; .31; .03; .02 INJECTION, SOLUTION INTRAVENOUS at 18:35

## 2025-05-30 RX ADMIN — OXYCODONE HYDROCHLORIDE 5 MG: 5 TABLET ORAL at 20:22

## 2025-05-30 RX ADMIN — AZITHROMYCIN MONOHYDRATE 500 MG: 500 INJECTION, POWDER, LYOPHILIZED, FOR SOLUTION INTRAVENOUS at 20:24

## 2025-05-30 RX ADMIN — IOHEXOL 100 ML: 350 INJECTION, SOLUTION INTRAVENOUS at 16:26

## 2025-05-30 RX ADMIN — ACETAMINOPHEN 975 MG: 325 TABLET ORAL at 15:48

## 2025-05-30 RX ADMIN — SODIUM CHLORIDE 500 ML: 0.9 INJECTION, SOLUTION INTRAVENOUS at 15:52

## 2025-05-30 RX ADMIN — ENOXAPARIN SODIUM 40 MG: 40 INJECTION SUBCUTANEOUS at 20:22

## 2025-05-30 RX ADMIN — AMPICILLIN SODIUM AND SULBACTAM SODIUM 3 G: 10; 5 INJECTION, POWDER, FOR SOLUTION INTRAMUSCULAR; INTRAVENOUS at 15:48

## 2025-05-30 NOTE — LETTER
[facility] MED SURG UN[facility]  [address] [facility] [address]  Dept: [phone]    June 1, 2025     Patient: Stephen Carbone   YOB: 1965   Date of Visit: 5/30/2025       To Whom it May Concern:    Stephen Carbone is under my professional care. He was seen in the hospital from 5/30/2025 to 06/01/25. He may return to work on 6/3/25  without limitations.    If you have any questions or concerns, please don't hesitate to call.         Sincerely,          Britton Coleman, DO

## 2025-05-30 NOTE — SEPSIS NOTE
Sepsis Note   Stephen Carbone 60 y.o. male MRN: [id number]  Unit/Bed#: ED 09 Encounter: [id number]       Initial Sepsis Screening       Row Name 05/30/25 1545                Is the patient's history suggestive of a new or worsening infection? Yes (Proceed)  -SB        Suspected source of infection pneumonia  -SB        Indicate SIRS criteria Hyperthemia > 38.3C (100.9F) OR Hypothermia <36C (96.8F);Tachycardia > 90 bpm;Leukocytosis (WBC > 84421 IJL) OR Leukopenia (WBC <4000 IJL) OR Bandemia (WBC >10% bands)  -SB        Are two or more of the above signs & symptoms of infection both present and new to the patient? Yes (Proceed)  -SB        Assess for evidence of organ dysfunction: Are any of the below criteria present within 6 hours of suspected infection and SIRS criteria that are NOT considered to be chronic conditions? --                  User Key  (r) = Recorded By, (t) = Taken By, (c) = Cosigned By      Initials Name Provider Type    BELLE Bergman PA-C Physician Assistant                   Initial Sepsis Screening       Row Name 05/30/25 1545                   Initial Sepsis Assessment    Is the patient's history suggestive of a new or worsening infection? Yes (Proceed)  -SB        Suspected source of infection pneumonia  -SB        Indicate SIRS criteria Hyperthemia > 38.3C (100.9F) OR Hypothermia <36C (96.8F);Tachycardia > 90 bpm;Leukocytosis (WBC > 29203 IJL) OR Leukopenia (WBC <4000 IJL) OR Bandemia (WBC >10% bands)  -SB        Are two or more of the above signs & symptoms of infection both present and new to the patient? Yes (Proceed)  -SB                  User Key  (r) = Recorded By, (t) = Taken By, (c) = Cosigned By      Initials Name Provider Type    BELLE Bergman PA-C Physician Assistant                         There is no height or weight on file to calculate BMI.  Wt Readings from Last 1 Encounters:   10/11/21 132 kg (290 lb)        Patient weight not recorded

## 2025-05-30 NOTE — ASSESSMENT & PLAN NOTE
Likely, source of sepsis  Proceed with Rocephin Zithromax  Check sputum culture  Obtain urine for Legionella and strep antigens  Monitor for evolution of hypoxia-currently on room air

## 2025-05-30 NOTE — ASSESSMENT & PLAN NOTE
Patient presents today (5/30) from home with complaints of shortness of breath which has been persistent and progressive over the past several weeks however particularly worsened over the past week or so  Patient reports chest pain sensation on breathing and coughing which has been ongoing for several weeks    In ED, patient meeting SIRS criteria with tachycardia, tachypnea, leukocytosis-without fever, lactic acidosis, hemodynamic compromise  CTA negative for PE but revealed right lower lobe pneumonia  Index suspicion for pulmonary source of SIRS  Without hemodynamic compromise or evidence of endorgan damage/lactic acidosis-weight-based volume resuscitation unnecessary  Initiated on maintenance fluids of broad-spectrum antimicrobials  Integument intact  UA negative    Plan:  Admit to hospitalist service  Monitor for evolution of SIRS criteria or shock physiology/endorgan damage  Maintenance fluids for now  Rocephin Zithromax for presumed pulmonary source  Blood cultures drawn and pending

## 2025-05-30 NOTE — ED NOTES
Patient was very uncomfortable in the ED bed requested a recline if available. Patient placed in a recliner and provided warm blankets and is now resting comfortably      Jaz Barnett RN  05/30/25 1907

## 2025-05-30 NOTE — H&P
H&P - Hospitalist   Name: Stephen Carbone 60 y.o. male I MRN: [id number]  Unit/Bed#: -01 I Date of Admission: 5/30/2025   Date of Service: 5/30/2025 I Hospital Day: 0     Assessment & Plan  Sepsis without acute organ dysfunction (HCC)  Patient presents today (5/30) from home with complaints of shortness of breath which has been persistent and progressive over the past several weeks however particularly worsened over the past week or so  Patient reports chest pain sensation on breathing and coughing which has been ongoing for several weeks    In ED, patient meeting SIRS criteria with tachycardia, tachypnea, leukocytosis-without fever, lactic acidosis, hemodynamic compromise  CTA negative for PE but revealed right lower lobe pneumonia  Index suspicion for pulmonary source of SIRS  Without hemodynamic compromise or evidence of endorgan damage/lactic acidosis-weight-based volume resuscitation unnecessary  Initiated on maintenance fluids of broad-spectrum antimicrobials  Integument intact  UA negative    Plan:  Admit to hospitalist service  Monitor for evolution of SIRS criteria or shock physiology/endorgan damage  Maintenance fluids for now  Rocephin Zithromax for presumed pulmonary source  Blood cultures drawn and pending      RLL pneumonia  Likely, source of sepsis  Proceed with Rocephin Zithromax  Check sputum culture  Obtain urine for Legionella and strep antigens  Monitor for evolution of hypoxia-currently on room air  Class 3 severe obesity due to excess calories without serious comorbidity with body mass index (BMI) of 40.0 to 44.9 in adult  Recommend incorporating a more whole foods plant-predominant diet along with decreasing consumption of red meats and processed foods  Per AHA guidelines, recommend moderate-vigorous intensity exercise for 30 minutes a day for 5 days a week or a total of 150 min/week      VTE Pharmacologic Prophylaxis:   Moderate Risk (Score 3-4) - Pharmacological DVT Prophylaxis Ordered:  enoxaparin (Lovenox).  Code Status: Level 1 - Full Code   Discussion with family: Updated  (significant other) at bedside.    Anticipated Length of Stay: Patient will be admitted on an inpatient basis with an anticipated length of stay of greater than 2 midnights secondary to sepsis pneumonia.    History of Present Illness   Chief Complaint: Shortness of breath    Stephen Carbone is a 60 y.o. male with a PMH of obesity, medical noncompliance who presents with dyspnea over the past several days.    Review of Systems   All other systems reviewed and are negative.      Historical Information   Past Medical History[1]  Past Surgical History[2]  Social History[3]  E-Cigarette/Vaping    E-Cigarette Use Never User      E-Cigarette/Vaping Substances    Nicotine No     CBD No     Flavoring No    Meds/Allergies   I have reviewed home medications with a medical source (PCP, Pharmacy, other).  Prior to Admission medications    Not on File     No Known Allergies    Objective :  Temp:  [100 °F (37.8 °C)] 100 °F (37.8 °C)  HR:  [80-99] 86  BP: ()/(60-94) 104/84  Resp:  [18-22] 18  SpO2:  [91 %-94 %] 91 %  O2 Device: None (Room air)    Physical Exam  Constitutional:       General: He is not in acute distress.     Appearance: He is obese. He is ill-appearing. He is not toxic-appearing or diaphoretic.   HENT:      Head: Normocephalic and atraumatic.      Right Ear: External ear normal.      Left Ear: External ear normal.      Mouth/Throat:      Pharynx: Oropharynx is clear.     Eyes:      Conjunctiva/sclera: Conjunctivae normal.     Pulmonary:      Effort: No respiratory distress.      Breath sounds: No stridor. Wheezing and rhonchi present. No rales.   Abdominal:      General: There is no distension.      Tenderness: There is no abdominal tenderness.     Musculoskeletal:      Right lower leg: No edema.      Left lower leg: No edema.     Skin:     Capillary Refill: Capillary refill takes less than 2 seconds.       "Findings: No lesion.     Neurological:      Gait: Gait normal.     Psychiatric:         Mood and Affect: Mood normal.         Behavior: Behavior normal.            Lines/Drains:            Lab Results: I have reviewed the following results:  Results from last 7 days   Lab Units 05/30/25  1514   WBC Thousand/uL 23.65*   HEMOGLOBIN g/dL 11.1*   HEMATOCRIT % 35.2*   PLATELETS Thousands/uL 329   SEGS PCT % 90*   LYMPHO PCT % 3*   MONO PCT % 6   EOS PCT % 0     Results from last 7 days   Lab Units 05/30/25  1514   SODIUM mmol/L 136   POTASSIUM mmol/L 4.0   CHLORIDE mmol/L 100   CO2 mmol/L 28   BUN mg/dL 12   CREATININE mg/dL 0.85   ANION GAP mmol/L 8   CALCIUM mg/dL 8.9   ALBUMIN g/dL 3.7   TOTAL BILIRUBIN mg/dL 1.33*   ALK PHOS U/L 271*   ALT U/L 70*   AST U/L 74*   GLUCOSE RANDOM mg/dL 126     Results from last 7 days   Lab Units 05/30/25  1514   INR  1.34*         No results found for: \"HGBA1C\"  Results from last 7 days   Lab Units 05/30/25  1514   LACTIC ACID mmol/L 1.5   PROCALCITONIN ng/ml 0.46*       Imaging Results Review: I reviewed radiology reports from this admission including: chest xray.  Other Study Results Review: EKG was reviewed.     Administrative Statements   I have spent a total time of 44 minutes in caring for this patient on the day of the visit/encounter including Risks and benefits of tx options, Patient and family education, Risk factor reductions, and Documenting in the medical record.    ** Please Note: This note has been constructed using a voice recognition system. **         [1]   Past Medical History:  Diagnosis Date    Known health problems: none    [2]   Past Surgical History:  Procedure Laterality Date    NO PAST SURGERIES     [3]   Social History  Tobacco Use    Smoking status: Never    Smokeless tobacco: Never   Vaping Use    Vaping status: Never Used   Substance and Sexual Activity    Alcohol use: Never    Drug use: Never     "

## 2025-05-31 LAB
ALBUMIN SERPL BCG-MCNC: 3.3 G/DL (ref 3.5–5)
ALP SERPL-CCNC: 218 U/L (ref 34–104)
ALT SERPL W P-5'-P-CCNC: 66 U/L (ref 7–52)
ANION GAP SERPL CALCULATED.3IONS-SCNC: 6 MMOL/L (ref 4–13)
AST SERPL W P-5'-P-CCNC: 54 U/L (ref 13–39)
BILIRUB SERPL-MCNC: 0.83 MG/DL (ref 0.2–1)
BUN SERPL-MCNC: 14 MG/DL (ref 5–25)
CALCIUM ALBUM COR SERPL-MCNC: 9.4 MG/DL (ref 8.3–10.1)
CALCIUM SERPL-MCNC: 8.8 MG/DL (ref 8.4–10.2)
CHLORIDE SERPL-SCNC: 103 MMOL/L (ref 96–108)
CO2 SERPL-SCNC: 29 MMOL/L (ref 21–32)
CREAT SERPL-MCNC: 0.69 MG/DL (ref 0.6–1.3)
ERYTHROCYTE [DISTWIDTH] IN BLOOD BY AUTOMATED COUNT: 14.3 % (ref 11.6–15.1)
GFR SERPL CREATININE-BSD FRML MDRD: 102 ML/MIN/1.73SQ M
GLUCOSE SERPL-MCNC: 110 MG/DL (ref 65–140)
HCT VFR BLD AUTO: 31.7 % (ref 36.5–49.3)
HGB BLD-MCNC: 9.6 G/DL (ref 12–17)
L PNEUMO1 AG UR QL IA.RAPID: NEGATIVE
MAGNESIUM SERPL-MCNC: 1.9 MG/DL (ref 1.9–2.7)
MCH RBC QN AUTO: 27 PG (ref 26.8–34.3)
MCHC RBC AUTO-ENTMCNC: 30.3 G/DL (ref 31.4–37.4)
MCV RBC AUTO: 89 FL (ref 82–98)
PLATELET # BLD AUTO: 244 THOUSANDS/UL (ref 149–390)
PMV BLD AUTO: 12.5 FL (ref 8.9–12.7)
POTASSIUM SERPL-SCNC: 4.1 MMOL/L (ref 3.5–5.3)
PROT SERPL-MCNC: 5.9 G/DL (ref 6.4–8.4)
RBC # BLD AUTO: 3.55 MILLION/UL (ref 3.88–5.62)
S PNEUM AG UR QL: POSITIVE
SODIUM SERPL-SCNC: 138 MMOL/L (ref 135–147)
WBC # BLD AUTO: 21.1 THOUSAND/UL (ref 4.31–10.16)

## 2025-05-31 PROCEDURE — 85027 COMPLETE CBC AUTOMATED: CPT | Performed by: FAMILY MEDICINE

## 2025-05-31 PROCEDURE — 99232 SBSQ HOSP IP/OBS MODERATE 35: CPT | Performed by: FAMILY MEDICINE

## 2025-05-31 PROCEDURE — 83735 ASSAY OF MAGNESIUM: CPT | Performed by: FAMILY MEDICINE

## 2025-05-31 PROCEDURE — 80053 COMPREHEN METABOLIC PANEL: CPT | Performed by: FAMILY MEDICINE

## 2025-05-31 RX ORDER — AZITHROMYCIN 250 MG/1
500 TABLET, FILM COATED ORAL EVERY 24 HOURS
Status: DISCONTINUED | OUTPATIENT
Start: 2025-05-31 | End: 2025-06-01 | Stop reason: HOSPADM

## 2025-05-31 RX ADMIN — ENOXAPARIN SODIUM 40 MG: 40 INJECTION SUBCUTANEOUS at 09:05

## 2025-05-31 RX ADMIN — OXYCODONE HYDROCHLORIDE 10 MG: 10 TABLET ORAL at 01:26

## 2025-05-31 RX ADMIN — ENOXAPARIN SODIUM 40 MG: 40 INJECTION SUBCUTANEOUS at 20:45

## 2025-05-31 RX ADMIN — AZITHROMYCIN DIHYDRATE 500 MG: 250 TABLET, FILM COATED ORAL at 17:52

## 2025-05-31 RX ADMIN — CEFTRIAXONE 2000 MG: 2 INJECTION, SOLUTION INTRAVENOUS at 17:53

## 2025-05-31 RX ADMIN — OXYCODONE HYDROCHLORIDE 10 MG: 10 TABLET ORAL at 09:13

## 2025-05-31 RX ADMIN — SODIUM CHLORIDE, SODIUM LACTATE, POTASSIUM CHLORIDE, AND CALCIUM CHLORIDE 150 ML/HR: .6; .31; .03; .02 INJECTION, SOLUTION INTRAVENOUS at 01:18

## 2025-05-31 RX ADMIN — SODIUM CHLORIDE, SODIUM LACTATE, POTASSIUM CHLORIDE, AND CALCIUM CHLORIDE 150 ML/HR: .6; .31; .03; .02 INJECTION, SOLUTION INTRAVENOUS at 09:08

## 2025-05-31 NOTE — ASSESSMENT & PLAN NOTE
Likely, source of sepsis  Proceed with Rocephin Zithromax  Check sputum culture-pending  Obtain urine for Legionella and strep antigens-pending  Monitor for evolution of hypoxia-currently on room air

## 2025-05-31 NOTE — PROGRESS NOTES
Progress Note - Hospitalist   Name: Stephen Carbone 60 y.o. male I MRN: [id number]  Unit/Bed#: -01 I Date of Admission: 5/30/2025   Date of Service: 5/31/2025 I Hospital Day: 1    Assessment & Plan  Sepsis without acute organ dysfunction (HCC)  Now, resolved  Patient presented (5/30) from home with complaints of shortness of breath which has been persistent and progressive over the past several weeks however particularly worsened over the past week or so  Patient reports chest pain sensation on breathing and coughing which has been ongoing for several weeks    In ED, patient meeting SIRS criteria with tachycardia, tachypnea, leukocytosis-without fever, lactic acidosis, hemodynamic compromise  CTA negative for PE but revealed right lower lobe pneumonia  Index suspicion for pulmonary source of SIRS  Without hemodynamic compromise or evidence of endorgan damage/lactic acidosis-weight-based volume resuscitation unnecessary  Initiated on maintenance fluids of broad-spectrum antimicrobials  Integument intact  UA negative    Today, patient remains with [id number]  Anticipate transition to oral regimen  RLL pneumonia  Likely, source of sepsis  Proceed with Rocephin Zithromax  Check sputum culture-pending  Obtain urine for Legionella and strep antigens-pending  Monitor for evolution of hypoxia-currently on room air  Class 3 severe obesity due to excess calories without serious comorbidity with body mass index (BMI) of 40.0 to 44.9 in adult  Recommend incorporating a more whole foods plant-predominant diet along with decreasing consumption of red meats and processed foods  Per AHA guidelines, recommend moderate-vigorous intensity exercise for 30 minutes a day for 5 days a week or a total of 150 min/week    VTE Pharmacologic Prophylaxis:   High Risk (Score >/= 5) - Pharmacological DVT Prophylaxis Ordered: heparin. Sequential Compression Devices Ordered.    Mobility:   Basic Mobility Inpatient Raw Score:  24  JH-HLM Goal: 8: Walk 250 feet or more  JH-HLM Achieved: 8: Walk 250 feet ot more  JH-HLM Goal achieved. Continue to encourage appropriate mobility.    Patient Centered Rounds: I performed bedside rounds with nursing staff today.   Discussions with Specialists or Other Care Team Provider:     Education and Discussions with Family / Patient: Patient declined call to .     Current Length of Stay: 1 day(s)  Current Patient Status: Inpatient   Certification Statement: The patient will continue to require additional inpatient hospital stay due to sepsis pneumonia  Discharge Plan: Anticipate discharge in 24-48 hrs to home with home services.    Code Status: Level 1 - Full Code    Subjective   Patient reports subjective improvement today.  States that his breathing is easier however still has a cough he denies fever or chills.  No events reported per nursing overnight.    Objective :  Temp:  [97.2 °F (36.2 °C)-100 °F (37.8 °C)] 97.5 °F (36.4 °C)  HR:  [72-99] 72  BP: ()/(60-94) 122/79  Resp:  [18-22] 18  SpO2:  [88 %-94 %] 90 %  O2 Device: None (Room air)    Body mass index is 42.93 kg/m².     Input and Output Summary (last 24 hours):     Intake/Output Summary (Last 24 hours) at 5/31/2025 1443  Last data filed at 5/31/2025 0908  Gross per 24 hour   Intake 2722.5 ml   Output 575 ml   Net 2147.5 ml       Physical Exam  Constitutional:       General: He is not in acute distress.     Appearance: He is obese. He is ill-appearing. He is not toxic-appearing or diaphoretic.   HENT:      Head: Normocephalic and atraumatic.      Right Ear: External ear normal.      Left Ear: External ear normal.      Mouth/Throat:      Pharynx: Oropharynx is clear.     Eyes:      Conjunctiva/sclera: Conjunctivae normal.     Pulmonary:      Effort: No respiratory distress.      Breath sounds: No stridor. Wheezing and rhonchi present. No rales.   Abdominal:      General: There is no distension.      Tenderness: There is no  abdominal tenderness.     Musculoskeletal:      Right lower leg: No edema.      Left lower leg: No edema.     Skin:     Capillary Refill: Capillary refill takes less than 2 seconds.      Findings: No lesion.     Neurological:      Gait: Gait normal.     Psychiatric:         Mood and Affect: Mood normal.         Behavior: Behavior normal.       Lines/Drains:              Lab Results: I have reviewed the following results:   Results from last 7 days   Lab Units 05/31/25  0408 05/30/25  1920 05/30/25  1514   WBC Thousand/uL 21.10*  --  23.65*   HEMOGLOBIN g/dL 9.6*  --  11.1*   HEMATOCRIT % 31.7*  --  35.2*   PLATELETS Thousands/uL 244   < > 329   SEGS PCT %  --   --  90*   LYMPHO PCT %  --   --  3*   MONO PCT %  --   --  6   EOS PCT %  --   --  0    < > = values in this interval not displayed.     Results from last 7 days   Lab Units 05/31/25  0408   SODIUM mmol/L 138   POTASSIUM mmol/L 4.1   CHLORIDE mmol/L 103   CO2 mmol/L 29   BUN mg/dL 14   CREATININE mg/dL 0.69   ANION GAP mmol/L 6   CALCIUM mg/dL 8.8   ALBUMIN g/dL 3.3*   TOTAL BILIRUBIN mg/dL 0.83   ALK PHOS U/L 218*   ALT U/L 66*   AST U/L 54*   GLUCOSE RANDOM mg/dL 110     Results from last 7 days   Lab Units 05/30/25  1514   INR  1.34*             Results from last 7 days   Lab Units 05/30/25  1514   LACTIC ACID mmol/L 1.5   PROCALCITONIN ng/ml 0.46*       Recent Cultures (last 7 days):   Results from last 7 days   Lab Units 05/30/25  1525 05/30/25  1514   BLOOD CULTURE  Received in Microbiology Lab. Culture in Progress. Received in Microbiology Lab. Culture in Progress.       Imaging Results Review: I reviewed radiology reports from this admission including: chest xray.  Other Study Results Review: EKG was reviewed.     Last 24 Hours Medication List:     Current Facility-Administered Medications:     acetaminophen (TYLENOL) tablet 650 mg, Q6H PRN    albuterol inhalation solution 2.5 mg, Q6H PRN    azithromycin (ZITHROMAX) 500 mg in sodium chloride 0.9 % 250  mL IVPB, Q24H, Last Rate: 500 mg (05/30/25 2024)    cefTRIAXone (ROCEPHIN) IVPB (premix in dextrose) 2,000 mg 50 mL, Q24H, Last Rate: 2,000 mg (05/30/25 1836)    dextromethorphan-guaiFENesin (ROBITUSSIN DM) oral syrup 10 mL, Q4H PRN    enoxaparin (LOVENOX) subcutaneous injection 40 mg, Q12H MARIA A    hydrALAZINE (APRESOLINE) injection 10 mg, Q6H PRN    HYDROmorphone (DILAUDID) injection 0.5 mg, Q2H PRN    oxyCODONE (ROXICODONE) IR tablet 5 mg, Q4H PRN **OR** oxyCODONE (ROXICODONE) immediate release tablet 10 mg, Q4H PRN    Administrative Statements   Today, Patient Was Seen By: Britton Coleman, DO  I have spent a total time of 34 minutes in caring for this patient on the day of the visit/encounter including Prognosis, Risks and benefits of tx options, Importance of tx compliance, Counseling / Coordination of care, and Reviewing/placing orders in the medical record (including tests, medications, and/or procedures).    **Please Note: This note may have been constructed using a voice recognition system.**

## 2025-05-31 NOTE — ASSESSMENT & PLAN NOTE
Now, resolved  Patient presented (5/30) from home with complaints of shortness of breath which has been persistent and progressive over the past several weeks however particularly worsened over the past week or so  Patient reports chest pain sensation on breathing and coughing which has been ongoing for several weeks    In ED, patient meeting SIRS criteria with tachycardia, tachypnea, leukocytosis-without fever, lactic acidosis, hemodynamic compromise  CTA negative for PE but revealed right lower lobe pneumonia  Index suspicion for pulmonary source of SIRS  Without hemodynamic compromise or evidence of endorgan damage/lactic acidosis-weight-based volume resuscitation unnecessary  Initiated on maintenance fluids of broad-spectrum antimicrobials  Integument intact  UA negative    Today, patient remains with suqdfabrlczz-nlviirtkw-bdpxcrca  Anticipate transition to oral regimen

## 2025-05-31 NOTE — PROGRESS NOTES
The azithromycin has / have been converted to Oral per [facility] IV-to-PO Auto-Conversion Protocol for Adults as approved by the Pharmacy and Therapeutics Committee. The patient met all eligible criteria:  1) Age = 18 years old   2) Received at least one dose of the IV form   3) Receiving at least one other scheduled oral/enteral medication   4) Tolerating an oral/enteral diet   and did not have any exclusions:   1) Critical care patient   2) Active GI bleed (IF assessing H2RAs or PPIs)   3) Continuous tube feeding (IF assessing cipro, doxycycline, levofloxacin, minocycline, rifampin, or voriconazole)   4) Receiving PO vancomycin (IF assessing metronidazole)   5) Persistent nausea and/or vomiting   6) Ileus or gastrointestinal obstruction   7) Elizabeth/nasogastric tube set for continuous suction   8) Specific order not to automatically convert to PO (in the order's comments or if discussed in the most recent Infectious Disease or primary team's progress notes).

## 2025-05-31 NOTE — UTILIZATION REVIEW
"Initial Clinical Review    Admission: Date/Time/Statement:   Admission Orders (From admission, onward)       Ordered        05/30/25 1724  INPATIENT ADMISSION  Once                          Orders Placed This Encounter   Procedures    INPATIENT ADMISSION     Standing Status:   Standing     Number of Occurrences:   1     Level of Care:   Med Surg [16]     Estimated length of stay:   More than 2 Midnights     Certification:   I certify that inpatient services are medically necessary for this patient for a duration of greater than two midnights. See H&P and MD Progress Notes for additional information about the patient's course of treatment.     ED Arrival Information       Expected   -    Arrival   5/30/2025 14:39    Acuity   Emergent              Means of arrival   Walk-In    Escorted by   Spouse    Service   Hospitalist    Admission type   Emergency              Arrival complaint   sob             Chief Complaint   Patient presents with    Chest Pain     Patient reports chest pain \"for a while.\" Seen at  today for same.     Shortness of Breath     Patient has been SOB for a few months. States nothing makes SOB better or worse       Initial Presentation: 60 y.o. male with a PMH of obesity presents to the ED from home with complaint of chest pain and shortness of breath, and productive cough, worsening symptoms over the last few weeks. The patient states he has not seen a doctor in many years. Was seen earlier today at urgent care and sent here for evaluation. ED labs revealed WBC of 23.65, procalcitonin 0.46. CT revealed R lower lobe pneumonia.  Patient received IVF and IV Unasyn in the ED.  Exam:  Tachycardia, tachypnea, decreased breath sounds and rhonchi.      5/30 Inpatient admission for evaluation and treatment of sepsis, presumed pulmonary source, RLL pneumonia: IVF, IV Rocephin, Zithromax, blood cultures pending. Check urine antigens.     5/31 Internal Medicine:  Sepsis resolved.  Leukocytosis improving, " continue antibiotics, anticipate transition to oral regimen. Exam:  Wheezing and rhonchi present.     6/1 Discharge Summary:   60 y.o. male patient who originally presented to the hospital on 5/30/2025 due to shortness of breath and feeling unwell.  Met sepsis criteria on presentation with index suspicion for source streptococcal pneumonia. Was maintained on Rocephin and Zithromax as empiric treatment over the course of hospitalization.  Transition to targeted antimicrobial therapy with resolution of respiratory symptoms and SIRS criteria.  Stable for discharge today       6/1 Discharge to home/self care.     ED Treatment-Medication Administration from 05/30/2025 1431 to 05/30/2025 1744         Date/Time Order Dose Route Action     05/30/2025 1548 acetaminophen (TYLENOL) tablet 975 mg 975 mg Oral Given     05/30/2025 1552 sodium chloride 0.9 % bolus 500 mL 500 mL Intravenous New Bag     05/30/2025 1548 ampicillin-sulbactam (UNASYN) 3 g in sodium chloride 0.9 % 100 mL IVPB 3 g Intravenous New Bag     05/30/2025 1630 sodium chloride 0.9 % bolus 2,190 mL 2,190 mL Intravenous New Bag     05/30/2025 1626 iohexol (OMNIPAQUE) 350 MG/ML injection (MULTI-DOSE) 100 mL 100 mL Intravenous Given            Scheduled Medications:  azithromycin, 500 mg, Intravenous, Q24H  cefTRIAXone, 2,000 mg, Intravenous, Q24H  enoxaparin, 40 mg, Subcutaneous, Q12H MARIA A      Continuous IV Infusions:    lactated ringers infusion  Rate: 150 mL/hr Dose: 150 mL/hr  Freq: Continuous Route: IV  Last Dose: Stopped (05/31/25 1430)  Start: 05/30/25 1800 End: 05/31/25 1410       PRN Meds:    acetaminophen, 650 mg, Oral, Q6H PRN  albuterol, 2.5 mg, Nebulization, Q6H PRN  dextromethorphan-guaiFENesin, 10 mL, Oral, Q4H PRN  hydrALAZINE, 10 mg, Intravenous, Q6H PRN  HYDROmorphone, 0.5 mg, Intravenous, Q2H PRN  oxyCODONE, 5 mg, Oral, Q4H PRN   Or  oxyCODONE, 10 mg, Oral, Q4H PRN x 2 doses 5/31      ED Triage Vitals   Temperature Pulse Respirations Blood  Pressure SpO2 Pain Score   05/30/25 1449 05/30/25 1449 05/30/25 1449 05/30/25 1450 05/30/25 1449 05/30/25 1548   100 °F (37.8 °C) 98 20 150/94 92 % 8     Weight (last 2 days)       Date/Time Weight    05/30/25 1604 136 (299.16)            Vital Signs (last 3 days)       Date/Time Temp Pulse Resp BP MAP (mmHg) SpO2 O2 Device Patient Position - Orthostatic VS David Coma Scale Score Pain    06/01/25 07:24:44 98.2 °F (36.8 °C) 63 18 129/72 91 94 % None (Room air) Sitting -- --    05/31/25 21:56:37 97.5 °F (36.4 °C) 68 -- 139/75 96 94 % -- -- -- --    05/31/25 2156 97.5 °F (36.4 °C) 68 18 139/75 96 95 % None (Room air) Sitting -- --    05/31/25 2004 -- -- -- -- -- -- None (Room air) -- 15 No Pain    05/31/25 1459 97.5 °F (36.4 °C) 77 18 125/97 106 92 % None (Room air) Sitting -- --    05/31/25 0917 -- -- -- -- -- 90 % None (Room air) -- 15 --    05/31/25 0913 -- -- -- -- -- -- -- -- -- 7    05/31/25 07:13:09 97.5 °F (36.4 °C) 72 18 122/79 93 89 % -- -- -- --    05/31/25 0346 98.4 °F (36.9 °C) -- -- -- -- -- -- -- -- --    05/31/25 0126 -- -- -- -- -- 91 % None (Room air) -- -- 8 05/30/25 2252 -- 75 19 -- -- 92 % -- -- -- --    05/30/25 2232 -- -- -- -- -- 91 % None (Room air) -- -- --    05/30/25 22:11:50 97.2 °F (36.2 °C) 75 18 128/87 101 88 % None (Room air) -- -- --    05/30/25 2022 -- -- -- -- -- 92 % None (Room air) -- 15 6    05/30/25 17:50:10 -- 86 18 104/84 91 91 % -- -- -- --    05/30/25 1730 -- 80 20 134/68 94 94 % None (Room air) Sitting -- --    05/30/25 1715 -- 84 20 141/76 102 93 % None (Room air) Sitting -- --    05/30/25 1700 -- 83 20 134/64 92 94 % None (Room air) Sitting -- --    05/30/25 1645 -- 83 20 131/65 92 91 % None (Room air) Sitting -- --    05/30/25 1630 -- 93 20 141/78 102 93 % None (Room air) Sitting -- --    05/30/25 1600 -- 96 22 91/74 79 92 % None (Room air) Sitting -- --    05/30/25 1549 -- 94 20 94/60 73 91 % None (Room air) Sitting -- --    05/30/25 1548 -- -- -- -- -- -- -- -- --  8    05/30/25 1500 -- -- -- -- -- -- -- -- 15 --    05/30/25 1455 -- 99 22 172/79 114 91 % None (Room air) Lying -- --    05/30/25 1450 -- -- -- 150/94 112 -- -- -- -- --    05/30/25 1449 100 °F (37.8 °C) 98 20 -- -- 92 % None (Room air) Lying -- --              Pertinent Labs/Diagnostic Test Results:   Radiology:  CT pe study w abdomen pelvis w contrast   Final Interpretation by Joseph Benitez MD (05/30 1645)      1.  Technically limited evaluation for pulmonary embolus due to suboptimal contrast bolus timing. No main or large central pulmonary embolus. Can not reliably evaluate the remainder pulmonary arteries due to suboptimal technique and motion artifact.   2.  Right lower lobe consolidation compatible with pneumonia. Recommend 3 months follow-up chest CT to ensure resolution and exclude underlying lesion.   3.  Small right pleural effusion.   4.  Right mediastinal and hilar lymphadenopathy, likely reactive.   5.  Urinary bladder wall thickening, likely related to under distention. Correlate with urine analysis for possible cystitis.   6.  1 cm left adrenal nodule. Although its imaging features are indeterminate, it does not have suspicious imaging features (heterogeneity, necrosis, irregular margins), therefore this is likely benign, and can be followed by non-contrast abdomen CT or    MRI in 12 months. Please note that for adrenal nodule > 1 cm, biochemical evaluation is suggested to rule out functioning adenoma, if not already performed. Adrenal recommendation based on institutional consensus and Journal of American College of    Radiology 2017; 14:9852-8607.            The study was marked in EPIC for immediate notification.      Workstation performed: [id number]                 Results from last 7 days   Lab Units 05/30/25 1920   SARS-COV-2  Negative     Results from last 7 days   Lab Units 06/01/25  0520 05/31/25  0408 05/30/25  1920 05/30/25  1514   WBC Thousand/uL 7.37 21.10*  --  23.65*   HEMOGLOBIN g/dL  9.5* 9.6*  --  11.1*   HEMATOCRIT % 31.2* 31.7*  --  35.2*   PLATELETS Thousands/uL 197 244 261 329   TOTAL NEUT ABS Thousands/µL  --   --   --  21.34*         Results from last 7 days   Lab Units 06/01/25  0520 05/31/25  0408 05/30/25  1514   SODIUM mmol/L 139 138 136   POTASSIUM mmol/L 4.1 4.1 4.0   CHLORIDE mmol/L 105 103 100   CO2 mmol/L 29 29 28   ANION GAP mmol/L 5 6 8   BUN mg/dL 18 14 12   CREATININE mg/dL 0.67 0.69 0.85   EGFR ml/min/1.73sq m 103 102 94   CALCIUM mg/dL 8.9 8.8 8.9   MAGNESIUM mg/dL 2.1 1.9 1.9     Results from last 7 days   Lab Units 05/31/25  0408 05/30/25  1514   AST U/L 54* 74*   ALT U/L 66* 70*   ALK PHOS U/L 218* 271*   TOTAL PROTEIN g/dL 5.9* 6.4   ALBUMIN g/dL 3.3* 3.7   TOTAL BILIRUBIN mg/dL 0.83 1.33*         Results from last 7 days   Lab Units 06/01/25  0520 05/31/25  0408 05/30/25  1514   GLUCOSE RANDOM mg/dL 94 110 126             Beta- Hydroxybutyrate   Date Value Ref Range Status   05/30/2025 <0.05 (L) 0.20 - 0.60 mmol/L Final          Results from last 7 days   Lab Units 05/30/25  1514   PH DOMINIK  7.444*   PCO2 DOMINIK mm Hg 38.1*   PO2 DOMINIK mm Hg 30.4*   HCO3 DOMINIK mmol/L 25.5   BASE EXC DOMINIK mmol/L 1.5   O2 CONTENT DOMINIK ml/dL 9.2   O2 HGB, VENOUS % 57.4*             Results from last 7 days   Lab Units 05/30/25  1920 05/30/25  1514   HS TNI 0HR ng/L  --  14   HS TNI 2HR ng/L 15  --    HSTNI D2 ng/L 1  --          Results from last 7 days   Lab Units 05/30/25  1514   PROTIME seconds 16.9*   INR  1.34*   PTT seconds 35*         Results from last 7 days   Lab Units 05/30/25  1514   PROCALCITONIN ng/ml 0.46*     Results from last 7 days   Lab Units 05/30/25  1514   LACTIC ACID mmol/L 1.5             Results from last 7 days   Lab Units 05/30/25  1514   BNP pg/mL 98                 Results from last 7 days   Lab Units 05/30/25  1537   CLARITY UA  Clear   COLOR UA  Orange   SPEC GRAV UA  1.025   PH UA  5.5   GLUCOSE UA mg/dl 100 (1/10%)*   KETONES UA mg/dl Trace*   BLOOD UA  Small*    PROTEIN UA mg/dl 100 (2+)*   NITRITE UA  Negative   BILIRUBIN UA  Moderate*   UROBILINOGEN UA E.U./dl >=8.0*   LEUKOCYTES UA  Negative   WBC UA /hpf 0-5   RBC UA /hpf 4-10*   BACTERIA UA /hpf Occasional   EPITHELIAL CELLS WET PREP /hpf Occasional   MUCUS THREADS  Occasional*     Results from last 7 days   Lab Units 05/30/25  1848   STREP PNEUMONIAE ANTIGEN, URINE  Positive*   LEGIONELLA URINARY ANTIGEN  Negative                             Results from last 7 days   Lab Units 05/30/25  1928 05/30/25  1525 05/30/25  1514   BLOOD CULTURE   --  No Growth at 24 hrs. No Growth at 24 hrs.   GRAM STAIN RESULT  Rare Polys*  1+ Epithelial Cells*  1+ Gram positive cocci in pairs*  1+ Gram positive rods*  --   --                    Past Medical History[1]  Present on Admission:   Sepsis without acute organ dysfunction (HCC)   RLL pneumonia      Admitting Diagnosis: Chest pain [R07.9]  Pneumonia [J18.9]  SOB (shortness of breath) [R06.02]  Sepsis (HCC) [A41.9]  Age/Sex: 60 y.o. male    Network Utilization Review Department  ATTENTION: Please call with any questions or concerns to [phone] and carefully listen to the prompts so that you are directed to the right person. All voicemails are confidential.   For Discharge needs, contact Care Management DC Support Team at [phone] opt. 2  Send all requests for admission clinical reviews, approved or denied determinations and any other requests to dedicated fax number below belonging to the campus where the patient is receiving treatment. List of dedicated fax numbers for the Facilities:  FACILITY NAME UR FAX NUMBER   ADMISSION DENIALS (Administrative/Medical Necessity) [phone]   DISCHARGE SUPPORT TEAM (NETWORK) [phone]   PARENT CHILD HEALTH (Maternity/NICU/Pediatrics) [phone]   [facility] [phone]   [facility] [phone]   [facility] [phone]   [facility]  [facility] [phone]   [facility] [phone]   [facility] [phone]   [facility] [phone]   [facility] [phone]   [facility] [phone]   [facility] [phone]   [facility] [phone]   [facility] [phone]              [1]   Past Medical History:  Diagnosis Date    Known health problems: none

## 2025-05-31 NOTE — PLAN OF CARE
Problem: PAIN - ADULT  Goal: Verbalizes/displays adequate comfort level or baseline comfort level  Description: Interventions:  - Encourage patient to monitor pain and request assistance  - Assess pain using appropriate pain scale  - Administer analgesics as ordered based on type and severity of pain and evaluate response  - Implement non-pharmacological measures as appropriate and evaluate response  - Consider cultural and social influences on pain and pain management  - Notify physician/advanced practitioner if interventions unsuccessful or patient reports new pain  - Educate patient/family on pain management process including their role and importance of  reporting pain   - Provide non-pharmacologic/complimentary pain relief interventions  Outcome: Progressing     Problem: INFECTION - ADULT  Goal: Absence or prevention of progression during hospitalization  Description: INTERVENTIONS:  - Assess and monitor for signs and symptoms of infection  - Monitor lab/diagnostic results  - Monitor all insertion sites, i.e. indwelling lines, tubes, and drains  - Monitor endotracheal if appropriate and nasal secretions for changes in amount and color  - [place] appropriate cooling/warming therapies per order  - Administer medications as ordered  - Instruct and encourage patient and family to use good hand hygiene technique  - Identify and instruct in appropriate isolation precautions for identified infection/condition  Outcome: Progressing  Goal: Absence of fever/infection during neutropenic period  Description: INTERVENTIONS:  - Monitor WBC  - Perform strict hand hygiene  - Limit to healthy visitors only  - No plants, dried, fresh or silk flowers with deleon in patient room  Outcome: Progressing     Problem: SAFETY ADULT  Goal: Patient will remain free of falls  Description: INTERVENTIONS:  - Educate patient/family on patient safety including physical limitations  - Instruct patient to call for assistance with activity   -  Consider consulting OT/PT to assist with strengthening/mobility based on AM PAC & JH-HLM score  - Consult OT/PT to assist with strengthening/mobility   - Keep Call bell within reach  - Keep bed low and locked with side rails adjusted as appropriate  - Keep care items and personal belongings within reach  - Initiate and maintain comfort rounds  - Make Fall Risk Sign visible to staff  - Offer Toileting every 2 Hours, in advance of need    - Apply yellow socks and bracelet for high fall risk patients  - Consider moving patient to room near nurses station  Outcome: Progressing  Goal: Maintain or return to baseline ADL function  Description: INTERVENTIONS:  -  Assess patient's ability to carry out ADLs; assess patient's baseline for ADL function and identify physical deficits which impact ability to perform ADLs (bathing, care of mouth/teeth, toileting, grooming, dressing, etc.)  - Assess/evaluate cause of self-care deficits   - Assess range of motion  - Assess patient's mobility; develop plan if impaired  - Assess patient's need for assistive devices and provide as appropriate  - Encourage maximum independence but intervene and supervise when necessary  - Involve family in performance of ADLs  - Assess for home care needs following discharge   - Consider OT consult to assist with ADL evaluation and planning for discharge  - Provide patient education as appropriate  - Monitor functional capacity and physical performance, use of AM PAC & JH-HLM   - Monitor gait, balance and fatigue with ambulation    Outcome: Progressing  Goal: Maintains/Returns to pre admission functional level  Description: INTERVENTIONS:  - Perform AM-PAC 6 Click Basic Mobility/ Daily Activity assessment daily.  - Set and communicate daily mobility goal to care team and patient/family/caregiver.   - Collaborate with rehabilitation services on mobility goals if consulted  - Perform Range of Motion 2 times a day.  - Reposition patient every 2 hours.  -  Dangle patient 3 times a day  - Stand patient 3 times a day  - Ambulate patient 3 times a day  - Out of bed to chair 3 times a day   - Out of bed for meals 3 times a day  - Out of bed for toileting  - Record patient progress and toleration of activity level   Outcome: Progressing     Problem: DISCHARGE PLANNING  Goal: Discharge to home or other facility with appropriate resources  Description: INTERVENTIONS:  - Identify barriers to discharge w/patient and caregiver  - Arrange for needed discharge resources and transportation as appropriate  - Identify discharge learning needs (meds, wound care, etc.)  - Arrange for interpretive services to assist at discharge as needed  - Refer to Case Management Department for coordinating discharge planning if the patient needs post-hospital services based on physician/advanced practitioner order or complex needs related to functional status, cognitive ability, or social support system  Outcome: Progressing     Problem: Knowledge Deficit  Goal: Patient/family/caregiver demonstrates understanding of disease process, treatment plan, medications, and discharge instructions  Description: Complete learning assessment and assess knowledge base.  Interventions:  - Provide teaching at level of understanding  - Provide teaching via preferred learning methods  Outcome: Progressing

## 2025-05-31 NOTE — PLAN OF CARE
Problem: SAFETY ADULT  Goal: Patient will remain free of falls  Description: INTERVENTIONS:  - Educate patient/family on patient safety including physical limitations  - Instruct patient to call for assistance with activity   - Consider consulting OT/PT to assist with strengthening/mobility based on AM PAC & JH-HLM score  - Consult OT/PT to assist with strengthening/mobility   - Keep Call bell within reach  - Keep bed low and locked with side rails adjusted as appropriate  - Keep care items and personal belongings within reach  - Initiate and maintain comfort rounds  - Make Fall Risk Sign visible to staff  - Offer Toileting every 2 Hours, in advance of need  - Initiate/Maintain bed/chair alarm  - Obtain necessary fall risk management equipment: socks, braclet  - Apply yellow socks and bracelet for high fall risk patients  - Consider moving patient to room near nurses station  Outcome: Progressing     Problem: PAIN - ADULT  Goal: Verbalizes/displays adequate comfort level or baseline comfort level  Description: Interventions:  - Encourage patient to monitor pain and request assistance  - Assess pain using appropriate pain scale  - Administer analgesics as ordered based on type and severity of pain and evaluate response  - Implement non-pharmacological measures as appropriate and evaluate response  - Consider cultural and social influences on pain and pain management  - Notify physician/advanced practitioner if interventions unsuccessful or patient reports new pain  - Educate patient/family on pain management process including their role and importance of  reporting pain   - Provide non-pharmacologic/complimentary pain relief interventions  Outcome: Progressing     Problem: INFECTION - ADULT  Goal: Absence or prevention of progression during hospitalization  Description: INTERVENTIONS:  - Assess and monitor for signs and symptoms of infection  - Monitor lab/diagnostic results  - Monitor all insertion sites, i.e.  indwelling lines, tubes, and drains  - Monitor endotracheal if appropriate and nasal secretions for changes in amount and color  - [place] appropriate cooling/warming therapies per order  - Administer medications as ordered  - Instruct and encourage patient and family to use good hand hygiene technique  - Identify and instruct in appropriate isolation precautions for identified infection/condition  Outcome: Progressing

## 2025-05-31 NOTE — PLAN OF CARE
Problem: PAIN - ADULT  Goal: Verbalizes/displays adequate comfort level or baseline comfort level  Description: Interventions:  - Encourage patient to monitor pain and request assistance  - Assess pain using appropriate pain scale  - Administer analgesics as ordered based on type and severity of pain and evaluate response  - Implement non-pharmacological measures as appropriate and evaluate response  - Consider cultural and social influences on pain and pain management  - Notify physician/advanced practitioner if interventions unsuccessful or patient reports new pain  - Educate patient/family on pain management process including their role and importance of  reporting pain   - Provide non-pharmacologic/complimentary pain relief interventions  Outcome: Progressing     Problem: INFECTION - ADULT  Goal: Absence or prevention of progression during hospitalization  Description: INTERVENTIONS:  - Assess and monitor for signs and symptoms of infection  - Monitor lab/diagnostic results  - Monitor all insertion sites, i.e. indwelling lines, tubes, and drains  - Monitor endotracheal if appropriate and nasal secretions for changes in amount and color  - [place] appropriate cooling/warming therapies per order  - Administer medications as ordered  - Instruct and encourage patient and family to use good hand hygiene technique  - Identify and instruct in appropriate isolation precautions for identified infection/condition  Outcome: Progressing  Goal: Absence of fever/infection during neutropenic period  Description: INTERVENTIONS:  - Monitor WBC  - Perform strict hand hygiene  - Limit to healthy visitors only  - No plants, dried, fresh or silk flowers with deleon in patient room  Outcome: Progressing     Problem: SAFETY ADULT  Goal: Patient will remain free of falls  Description: INTERVENTIONS:  - Educate patient/family on patient safety including physical limitations  - Instruct patient to call for assistance with activity   -  Consider consulting OT/PT to assist with strengthening/mobility based on AM PAC & JH-HLM score  - Consult OT/PT to assist with strengthening/mobility   - Keep Call bell within reach  - Keep bed low and locked with side rails adjusted as appropriate  - Keep care items and personal belongings within reach  - Initiate and maintain comfort rounds  - Make Fall Risk Sign visible to staff  - Offer Toileting every  Hours, in advance of need  - Initiate/Maintain alarm  - Obtain necessary fall risk management equipment:   - Apply yellow socks and bracelet for high fall risk patients  - Consider moving patient to room near nurses station  Outcome: Progressing  Goal: Maintain or return to baseline ADL function  Description: INTERVENTIONS:  -  Assess patient's ability to carry out ADLs; assess patient's baseline for ADL function and identify physical deficits which impact ability to perform ADLs (bathing, care of mouth/teeth, toileting, grooming, dressing, etc.)  - Assess/evaluate cause of self-care deficits   - Assess range of motion  - Assess patient's mobility; develop plan if impaired  - Assess patient's need for assistive devices and provide as appropriate  - Encourage maximum independence but intervene and supervise when necessary  - Involve family in performance of ADLs  - Assess for home care needs following discharge   - Consider OT consult to assist with ADL evaluation and planning for discharge  - Provide patient education as appropriate  - Monitor functional capacity and physical performance, use of AM PAC & JH-HLM   - Monitor gait, balance and fatigue with ambulation    Outcome: Progressing  Goal: Maintains/Returns to pre admission functional level  Description: INTERVENTIONS:  - Perform AM-PAC 6 Click Basic Mobility/ Daily Activity assessment daily.  - Set and communicate daily mobility goal to care team and patient/family/caregiver.   - Collaborate with rehabilitation services on mobility goals if consulted  -  Perform Range of Motion  times a day.  - Reposition patient every  hours.  - Dangle patient  times a day  - Stand patient  times a day  - Ambulate patient  times a day  - Out of bed to chair  times a day   - Out of bed for meals times a day  - Out of bed for toileting  - Record patient progress and toleration of activity level   Outcome: Progressing     Problem: DISCHARGE PLANNING  Goal: Discharge to home or other facility with appropriate resources  Description: INTERVENTIONS:  - Identify barriers to discharge w/patient and caregiver  - Arrange for needed discharge resources and transportation as appropriate  - Identify discharge learning needs (meds, wound care, etc.)  - Arrange for interpretive services to assist at discharge as needed  - Refer to Case Management Department for coordinating discharge planning if the patient needs post-hospital services based on physician/advanced practitioner order or complex needs related to functional status, cognitive ability, or social support system  Outcome: Progressing     Problem: Knowledge Deficit  Goal: Patient/family/caregiver demonstrates understanding of disease process, treatment plan, medications, and discharge instructions  Description: Complete learning assessment and assess knowledge base.  Interventions:  - Provide teaching at level of understanding  - Provide teaching via preferred learning methods  Outcome: Progressing

## 2025-05-31 NOTE — CASE MANAGEMENT
Case Management Assessment & Discharge Planning Note    Patient name Stephen Carbone  Location /-01 MRN [id number]  : 1965 Date 2025       Current Admission Date: 2025  Current Admission Diagnosis:Sepsis without acute organ dysfunction (HCC)   Patient Active Problem List    Diagnosis Date Noted    Sepsis without acute organ dysfunction (HCC) 2025    RLL pneumonia 2025    Morbid obesity (HCC) 2020    Class 3 severe obesity due to excess calories without serious comorbidity with body mass index (BMI) of 40.0 to 44.9 in adult 2020    Hypokalemia 2020    Thrombocytopenia (HCC) 2020    Essential tremor 2020      LOS (days): 1  Geometric Mean LOS (GMLOS) (days):   Days to GMLOS:     OBJECTIVE:    Risk of Unplanned Readmission Score: 11.3         Current admission status: Inpatient  Referral Reason: Other (Disposition Planning)    Preferred Pharmacy:   [id number] - [address] [address]  Phone: [phone] Fax: [phone]    Primary Care Provider: Cassie Myrick MD    Primary Insurance:   Secondary Insurance:     ASSESSMENT:  Active Health Care Proxies       Venus Carbone Health Care Representative - Spouse   Primary Phone: [phone] (Mobile)                 Advance Directives  Does patient have a Health Care POA?: No  Was patient offered paperwork?: Yes (pt declined)  Does patient currently have a Health Care decision maker?: Yes, please see Health Care Proxy section  Does patient have Advance Directives?: No  Was patient offered paperwork?: Yes (pt declined)  Primary Contact: Venus Carbone, spouse         Readmission Root Cause  30 Day Readmission: No    Patient Information  Admitted from:: Home  Mental Status: Alert  During Assessment patient was accompanied by: Not accompanied during assessment  Assessment information provided by:: Patient  Primary Caregiver: Self  Support  Systems: Spouse/significant other, Daughter, Family members  [place] Residence: [facility]  What [facility] do you live in?: [place]  Home entry access options. Select all that apply.: No steps to enter home  Type of Current Residence: Apartment  Floor Level: 1  Upon entering residence, is there a bedroom on the main floor (no further steps)?: Yes  Upon entering residence, is there a bathroom on the main floor (no further steps)?: Yes  Living Arrangements: Lives w/ Spouse/significant other  Is patient a ?: Yes ([employer], 3 years)  Is patient active with [facility] ([employer])?: No    Activities of Daily Living Prior to Admission  Functional Status: Independent  Completes ADLs independently?: Yes  Ambulates independently?: Yes  Does patient use assisted devices?: No  Does patient currently own DME?: No  Does patient have a history of Outpatient Therapy (PT/OT)?: Yes  Does the patient have a history of Short-Term Rehab?: No  Does patient have a history of HHC?: No  Does patient currently have HHC?: No         Patient Information Continued  Income Source: Employed  Does patient have prescription coverage?: No  Can the patient afford their medications and any related supplies (such as glucometers or test strips)?: Yes  Does patient receive dialysis treatments?: No  Does patient have a history of substance abuse?: No  Does patient have a history of Mental Health Diagnosis?: No         Means of Transportation  Means of Transport to Appts:: Drives Self          DISCHARGE DETAILS:    Discharge planning discussed with:: patient  Freedom of Choice: Yes     CM contacted family/caregiver?: No- see comments (pt declined)  Were Treatment Team discharge recommendations reviewed with patient/caregiver?: Yes  Did patient/caregiver verbalize understanding of patient care needs?: Yes            Requested Home Health Care         Is the patient interested in HHC at discharge?: No    DME Referral Provided  Referral made for DME?: No               Treatment Team Recommendation: Home  Discharge Destination Plan:: Home  Transport at Discharge : Family                 CM met with patient at the bedside, baseline information was obtained. CM discussed the role of CM in helping the patient develop a discharge plan and assist the patient in carry out their plan.     Patient lives in first floor apartment with spouse and 22 year old daughter.  Patient employed full time at [employer]. Patient independent with ambulation and drives.     Patient has no health insurance or PCP.  CM discussed with patient referral to [facility] for health insurance application. CM encouraged patient to complete all requests from [facility] and patient thankful.  Patient indicated he last had health insurance 2 years ago.    Patient does not have a PCP but is agreeable to referral to [facility] or to Internal Medicine for follow up referral.     CM completed email to [facility] requesting referral to [facility].     CM updated patients email in chart.     CM to follow for medical stability for discharge and CM discharge referral needs.

## 2025-05-31 NOTE — ED PROVIDER NOTES
"Time reflects when diagnosis was documented in both MDM as applicable and the Disposition within this note       Time User Action Codes Description Comment    5/30/2025  4:54 PM Christiano Bergman [J18.9] Pneumonia     5/30/2025  4:54 PM Christiano Bergman [A41.9] Sepsis (HCC)     5/30/2025  4:54 PM Christiano Bergman [R06.02] SOB (shortness of breath)           ED Disposition       ED Disposition   Admit    Condition   Stable    Date/Time   Fri May 30, 2025  4:54 PM    Comment   Case was discussed with  and the patient's admission status was agreed to be Admission Status: inpatient status to the service of   .               Assessment & Plan       Medical Decision Making  The patient is a 60-year-old male who presents emergency derment today with a chief complaint of chest pain and shortness of breath.  The patient states that he has been having worsening symptoms over the last few weeks of shortness of breath.  The patient states he has not seen a doctor in many years.  Does not follow with a PCP.  Patient states he is a non-smoker no history of COPD or asthma as far as he knows.  He states that over the last 2 days he has been having chest pain like he had \"when I had pneumonia\".  States he is coughing up yellow sputum.  Is having no subjective fevers or chills.  Denies any nausea vomiting abdominal pain distention.  Apparently was seen earlier today at urgent care was seen to have \"fluid in his lungs\" and was sent here for evaluation    Patient's EKG was without any acute ischemic findings.  Patient's blood work demonstrated leukocytosis and elevated procalcitonin.  Patient's vital signs were positive for sepsis criteria.  Patient was a sepsis alert.  Patient initially had normal blood pressure upon arrival did intermittently drop into the 90s systolically was given 30 mg/kg bolus for ideal body weight due to patient's body habitus.  Patient was covered with Unasyn.  Patient CT PE studies to the abdomen did " confirm pneumonia.  Patient was in agreement with treatment plan and inpatient treatment for sepsis criteria with IV antibiotics.  The patient on room air at rest was 92%.    Differential diagnosis included but was not limited to :Pneumonia, Sinusitis, URI, ACS, GERD, PE, CHF, lung disease, HTN urgency, sepsis      Amount and/or Complexity of Data Reviewed  Labs: ordered. Decision-making details documented in ED Course.  Radiology: ordered and independent interpretation performed. Decision-making details documented in ED Course.  ECG/medicine tests: ordered and independent interpretation performed. Decision-making details documented in ED Course.    Risk  OTC drugs.  Prescription drug management.  Decision regarding hospitalization.        ED Course as of 05/30/25 2326   Fri May 30, 2025   1541 WBC(!): 23.65   1703 Patient's BMI > 30. For purposes of fluid resuscitation, IBW was utilized to calculate target volume to be administered.      1703 Reaching out to slim        Medications   acetaminophen (TYLENOL) tablet 975 mg (975 mg Oral Given 5/30/25 1548)   sodium chloride 0.9 % bolus 500 mL (0 mL Intravenous Stopped 5/30/25 1652)   ampicillin-sulbactam (UNASYN) 3 g in sodium chloride 0.9 % 100 mL IVPB (0 g Intravenous Stopped 5/30/25 1618)   sodium chloride 0.9 % bolus 2,190 mL (2,190 mL Intravenous New Bag 5/30/25 1630)   iohexol (OMNIPAQUE) 350 MG/ML injection (MULTI-DOSE) 100 mL (100 mL Intravenous Given 5/30/25 1626)       ED Risk Strat Scores                    No data recorded        SBIRT 22yo+      Flowsheet Row Most Recent Value   Initial Alcohol Screen: US AUDIT-C     1. How often do you have a drink containing alcohol? 0 Filed at: 05/30/2025 1449   2. How many drinks containing alcohol do you have on a typical day you are drinking?  0 Filed at: 05/30/2025 1449   3a. Male UNDER 65: How often do you have five or more drinks on one occasion? 0 Filed at: 05/30/2025 1449   Audit-C Score 0 Filed at: 05/30/2025  "1449   MITESH: How many times in the past year have you...    Used an illegal drug or used a prescription medication for non-medical reasons? Never Filed at: 05/30/2025 1449                            History of Present Illness       Chief Complaint   Patient presents with    Chest Pain     Patient reports chest pain \"for a while.\" Seen at  today for same.     Shortness of Breath     Patient has been SOB for a few months. States nothing makes SOB better or worse       Past Medical History[1]   Past Surgical History[2]   Family History[3]   Social History[4]   E-Cigarette/Vaping    E-Cigarette Use Never User       E-Cigarette/Vaping Substances    Nicotine No     CBD No     Flavoring No       I have reviewed and agree with the history as documented.     The patient is a 60-year-old male who presents emergency derment today with a chief complaint of chest pain and shortness of breath.  The patient states that he has been having worsening symptoms over the last few weeks of shortness of breath.  The patient states he has not seen a doctor in many years.  Does not follow with a PCP.  Patient states he is a non-smoker no history of COPD or asthma as far as he knows.  He states that over the last 2 days he has been having chest pain like he had \"when I had pneumonia\".  States he is coughing up yellow sputum.  Is having no subjective fevers or chills.  Denies any nausea vomiting abdominal pain distention.  Apparently was seen earlier today at urgent care was seen to have \"fluid in his lungs\" and was sent here for evaluation          Review of Systems   All other systems reviewed and are negative.          Objective       ED Triage Vitals   Temperature Pulse Blood Pressure Respirations SpO2 Patient Position - Orthostatic VS   05/30/25 1449 05/30/25 1449 05/30/25 1450 05/30/25 1449 05/30/25 1449 05/30/25 1449   100 °F (37.8 °C) 98 150/94 20 92 % Lying      Temp Source Heart Rate Source BP Location FiO2 (%) Pain Score  "   05/30/25 1449 05/30/25 1449 05/30/25 1449 -- 05/30/25 1548    Oral Monitor Left arm  8      Vitals      Date and Time Temp Pulse SpO2 Resp BP Pain Score FACES Pain Rating User   05/30/25 2252 -- 75 92 % 19 -- -- -- DK   05/30/25 2232 -- -- 91 % -- -- -- --    05/30/25 2211 -- -- 88 % pt up to bathroom -- -- -- -- JR   05/30/25 2211 97.2 °F (36.2 °C) -- -- -- -- -- -- JL   05/30/25 2211 -- 75 -- 18 128/87 -- -- DII   05/30/25 2022 -- -- 92 % -- -- 6 --    05/30/25 1750 -- 86 91 % 18 104/84 -- -- DII   05/30/25 1730 -- 80 94 % 20 134/68 -- -- CG   05/30/25 1715 -- 84 93 % 20 141/76 -- -- CG   05/30/25 1700 -- 83 94 % 20 134/64 -- -- CG   05/30/25 1645 -- 83 91 % 20 131/65 -- -- CG   05/30/25 1630 -- 93 93 % 20 141/78 -- -- CG   05/30/25 1600 -- 96 92 % 22 91/74 -- --    05/30/25 1549 -- 94 91 % 20 94/60 -- -- MD   05/30/25 1548 -- -- -- -- -- 8 -- MD   05/30/25 1455 -- 99 91 % 22 172/79 -- -- CG   05/30/25 1450 -- -- -- -- 150/94 -- --    05/30/25 1449 100 °F (37.8 °C) 98 92 % 20 -- -- --             Physical Exam  Vitals and nursing note reviewed.   Constitutional:       General: He is in acute distress.      Appearance: He is well-developed.   HENT:      Head: Normocephalic and atraumatic.     Eyes:      Extraocular Movements: Extraocular movements intact.      Pupils: Pupils are equal, round, and reactive to light.       Cardiovascular:      Rate and Rhythm: Regular rhythm. Tachycardia present.      Heart sounds: No murmur heard.  Pulmonary:      Effort: Pulmonary effort is normal. Tachypnea present. No respiratory distress.      Breath sounds: Decreased breath sounds and rhonchi present.   Chest:      Chest wall: No tenderness.   Abdominal:      General: Bowel sounds are normal.      Palpations: Abdomen is soft.      Tenderness: There is no abdominal tenderness.     Musculoskeletal:      Cervical back: Normal range of motion.      Right lower leg: No tenderness. No edema.      Left lower leg: No  tenderness. No edema.     Skin:     General: Skin is warm and dry.      Capillary Refill: Capillary refill takes less than 2 seconds.     Neurological:      Mental Status: He is alert and oriented to person, place, and time.     Psychiatric:         Behavior: Behavior normal.         Results Reviewed       Procedure Component Value Units Date/Time    Blood culture #1 [[id number]] Collected: 05/30/25 1525    Lab Status: Preliminary result Specimen: Blood from Arm, Left Updated: 05/30/25 2201     Blood Culture Received in Microbiology Lab. Culture in Progress.    Blood culture #2 [[id number]] Collected: 05/30/25 1514    Lab Status: Preliminary result Specimen: Blood from Arm, Right Updated: 05/30/25 2201     Blood Culture Received in Microbiology Lab. Culture in Progress.    HS Troponin I 2hr [[id number]]  (Normal) Collected: 05/30/25 1920    Lab Status: Final result Specimen: Blood from Arm, Left Updated: 05/30/25 1955     hs TnI 2hr 15 ng/L      Delta 2hr hsTnI 1 ng/L     Platelet count [[id number]]  (Normal) Collected: 05/30/25 1920    Lab Status: Final result Specimen: Blood from Arm, Left Updated: 05/30/25 1926     Platelets 261 Thousands/uL      MPV 11.9 fL     Beta Hydroxybutyrate [[id number]]  (Abnormal) Collected: 05/30/25 1514    Lab Status: Final result Specimen: Blood from Arm, Right Updated: 05/30/25 1559     Beta- Hydroxybutyrate <0.05 mmol/L     Procalcitonin [[id number]]  (Abnormal) Collected: 05/30/25 1514    Lab Status: Final result Specimen: Blood from Arm, Right Updated: 05/30/25 1553     Procalcitonin 0.46 ng/ml     Urine Microscopic [[id number]]  (Abnormal) Collected: 05/30/25 1537    Lab Status: Final result Specimen: Urine, Clean Catch Updated: 05/30/25 1551     RBC, UA 4-10 /hpf      WBC, UA 0-5 /hpf      Epithelial Cells Occasional /hpf      Bacteria, UA Occasional /hpf      MUCUS THREADS Occasional    B-Type Natriuretic Peptide(BNP) [[id number]]  (Normal) Collected: 05/30/25 1514    Lab Status:  Final result Specimen: Blood from Arm, Right Updated: 05/30/25 1551     BNP 98 pg/mL     HS Troponin 0hr (reflex protocol) [[id number]]  (Normal) Collected: 05/30/25 1514    Lab Status: Final result Specimen: Blood from Arm, Right Updated: 05/30/25 1550     hs TnI 0hr 14 ng/L     Comprehensive metabolic panel [[id number]]  (Abnormal) Collected: 05/30/25 1514    Lab Status: Final result Specimen: Blood from Arm, Right Updated: 05/30/25 1546     Sodium 136 mmol/L      Potassium 4.0 mmol/L      Chloride 100 mmol/L      CO2 28 mmol/L      ANION GAP 8 mmol/L      BUN 12 mg/dL      Creatinine 0.85 mg/dL      Glucose 126 mg/dL      Calcium 8.9 mg/dL      AST 74 U/L      ALT 70 U/L      Alkaline Phosphatase 271 U/L      Total Protein 6.4 g/dL      Albumin 3.7 g/dL      Total Bilirubin 1.33 mg/dL      eGFR 94 ml/min/1.73sq m     Narrative:      [employer] Kid[employer] guidelines for Chronic Kidney Disease (CKD):     Stage 1 with normal or high GFR (GFR > 90 mL/min/1.73 square meters)    Stage 2 Mild CKD (GFR = 60-89 mL/min/1.73 square meters)    Stage 3A Moderate CKD (GFR = 45-59 mL/min/1.73 square meters)    Stage 3B Moderate CKD (GFR = 30-44 mL/min/1.73 square meters)    Stage 4 Severe CKD (GFR = 15-29 mL/min/1.73 square meters)    Stage 5 End Stage CKD (GFR <15 mL/min/1.73 square meters)  Note: GFR calculation is accurate only with a steady state creatinine    Lactic acid [[id number]]  (Normal) Collected: 05/30/25 1514    Lab Status: Final result Specimen: Blood from Arm, Right Updated: 05/30/25 1546     LACTIC ACID 1.5 mmol/L     Narrative:      Result may be elevated if tourniquet was used during collection.    Magnesium [[id number]]  (Normal) Collected: 05/30/25 1514    Lab Status: Final result Specimen: Blood from Arm, Right Updated: 05/30/25 1546     Magnesium 1.9 mg/dL     UA w Reflex to Microscopic w Reflex to Culture [[id number]]  (Abnormal) Collected: 05/30/25 1537    Lab Status: Final result Specimen:  Urine, Clean Catch Updated: 05/30/25 1544     Color, UA Orange     Clarity, UA Clear     Specific Gravity, UA 1.025     pH, UA 5.5     Leukocytes, UA Negative     Nitrite, UA Negative     Protein,  (2+) mg/dl      Glucose,  (1/10%) mg/dl      Ketones, UA Trace mg/dl      Urobilinogen, UA >=8.0 E.U./dl      Bilirubin, UA Moderate     Occult Blood, UA Small    Protime-INR [[id number]]  (Abnormal) Collected: 05/30/25 1514    Lab Status: Final result Specimen: Blood from Arm, Right Updated: 05/30/25 1542     Protime 16.9 seconds      INR 1.34    Narrative:      INR Therapeutic Range    Indication                                             INR Range      Atrial Fibrillation                                               2.0-3.0  Hypercoagulable State                                    2.0.2.3  Left Ventricular Asist Device                            2.0-3.0  Mechanical Heart Valve                                  -    Aortic(with afib, MI, embolism, HF, LA enlargement,    and/or coagulopathy)                                     2.0-3.0 (2.5-3.5)     Mitral                                                             2.5-3.5  Prosthetic/Bioprosthetic Heart Valve               2.0-3.0  Venous thromboembolism (VTE: VT, PE        2.0-3.0    APTT [[id number]]  (Abnormal) Collected: 05/30/25 1514    Lab Status: Final result Specimen: Blood from Arm, Right Updated: 05/30/25 1542     PTT 35 seconds     CBC and differential [[id number]]  (Abnormal) Collected: 05/30/25 1514    Lab Status: Final result Specimen: Blood from Arm, Right Updated: 05/30/25 1532     WBC 23.65 Thousand/uL      RBC 4.04 Million/uL      Hemoglobin 11.1 g/dL      Hematocrit 35.2 %      MCV 87 fL      MCH 27.5 pg      MCHC 31.5 g/dL      RDW 14.4 %      MPV 12.5 fL      Platelets 329 Thousands/uL      nRBC 0 /100 WBCs      Segmented % 90 %      Immature Grans % 1 %      Lymphocytes % 3 %      Monocytes % 6 %      Eosinophils Relative 0 %       Basophils Relative 0 %      Absolute Neutrophils 21.34 Thousands/µL      Absolute Immature Grans 0.17 Thousand/uL      Absolute Lymphocytes 0.75 Thousands/µL      Absolute Monocytes 1.31 Thousand/µL      Eosinophils Absolute 0.04 Thousand/µL      Basophils Absolute 0.04 Thousands/µL     Narrative:      This is an appended report.  These results have been appended to a previously verified report.    Blood gas, Venous [[id number]]  (Abnormal) Collected: 05/30/25 1514    Lab Status: Final result Specimen: Blood from Arm, Right Updated: 05/30/25 1529     pH, Misael 7.444     pCO2, Misael 38.1 mm Hg      pO2, Misael 30.4 mm Hg      HCO3, Misael 25.5 mmol/L      Base Excess, Misael 1.5 mmol/L      O2 Content, Misael 9.2 ml/dL      O2 HGB, VENOUS 57.4 %             CT pe study w abdomen pelvis w contrast   Final Interpretation by Joseph Benitez MD (05/30 1001)      1.  Technically limited evaluation for pulmonary embolus due to suboptimal contrast bolus timing. No main or large central pulmonary embolus. Can not reliably evaluate the remainder pulmonary arteries due to suboptimal technique and motion artifact.   2.  Right lower lobe consolidation compatible with pneumonia. Recommend 3 months follow-up chest CT to ensure resolution and exclude underlying lesion.   3.  Small right pleural effusion.   4.  Right mediastinal and hilar lymphadenopathy, likely reactive.   5.  Urinary bladder wall thickening, likely related to under distention. Correlate with urine analysis for possible cystitis.   6.  1 cm left adrenal nodule. Although its imaging features are indeterminate, it does not have suspicious imaging features (heterogeneity, necrosis, irregular margins), therefore this is likely benign, and can be followed by non-contrast abdomen CT or    MRI in 12 months. Please note that for adrenal nodule > 1 cm, biochemical evaluation is suggested to rule out functioning adenoma, if not already performed. Adrenal recommendation based on institutional  consensus and Journal of American College of    Radiology 2017; 14:[id number].            The study was marked in EPIC for immediate notification.      Workstation performed: [id number]             Procedures    ED Medication and Procedure Management   None     There are no discharge medications for this patient.    No discharge procedures on file.  ED SEPSIS DOCUMENTATION   Time reflects when diagnosis was documented in both MDM as applicable and the Disposition within this note       Time User Action Codes Description Comment    5/30/2025  4:54 PM Christiano Bergman [J18.9] Pneumonia     5/30/2025  4:54 PM Christiano Bergman [A41.9] Sepsis (HCC)     5/30/2025  4:54 PM Christiano Bergman [R06.02] SOB (shortness of breath)            Initial Sepsis Screening       Row Name 05/30/25 9579                Is the patient's history suggestive of a new or worsening infection? Yes (Proceed)  -SB        Suspected source of infection pneumonia  -SB        Indicate SIRS criteria Hyperthemia > 38.3C (100.9F) OR Hypothermia <36C (96.8F);Tachycardia > 90 bpm;Leukocytosis (WBC > 19527 IJL) OR Leukopenia (WBC <4000 IJL) OR Bandemia (WBC >10% bands)  -SB        Are two or more of the above signs & symptoms of infection both present and new to the patient? Yes (Proceed)  -SB        Assess for evidence of organ dysfunction: Are any of the below criteria present within 6 hours of suspected infection and SIRS criteria that are NOT considered to be chronic conditions? --                  User Key  (r) = Recorded By, (t) = Taken By, (c) = Cosigned By      Initials Name Provider Type    SB Christiano Bergman PA-C Physician Assistant                         [1]   Past Medical History:  Diagnosis Date    Known health problems: none    [2]   Past Surgical History:  Procedure Laterality Date    NO PAST SURGERIES     [3]   Family History  Problem Relation Name Age of Onset    Heart disease Mother      COPD Father     [4]   Social History  Tobacco  Use    Smoking status: Never    Smokeless tobacco: Never   Vaping Use    Vaping status: Never Used   Substance Use Topics    Alcohol use: Never    Drug use: Never        Christiano Bergman PA-C  05/30/25 0596

## 2025-06-01 VITALS
DIASTOLIC BLOOD PRESSURE: 72 MMHG | SYSTOLIC BLOOD PRESSURE: 129 MMHG | BODY MASS INDEX: 42.83 KG/M2 | HEIGHT: 70 IN | OXYGEN SATURATION: 94 % | HEART RATE: 63 BPM | RESPIRATION RATE: 18 BRPM | TEMPERATURE: 98.2 F | WEIGHT: 299.16 LBS

## 2025-06-01 LAB
ANION GAP SERPL CALCULATED.3IONS-SCNC: 5 MMOL/L (ref 4–13)
BUN SERPL-MCNC: 18 MG/DL (ref 5–25)
CALCIUM SERPL-MCNC: 8.9 MG/DL (ref 8.4–10.2)
CHLORIDE SERPL-SCNC: 105 MMOL/L (ref 96–108)
CO2 SERPL-SCNC: 29 MMOL/L (ref 21–32)
CREAT SERPL-MCNC: 0.67 MG/DL (ref 0.6–1.3)
ERYTHROCYTE [DISTWIDTH] IN BLOOD BY AUTOMATED COUNT: 14.5 % (ref 11.6–15.1)
GFR SERPL CREATININE-BSD FRML MDRD: 103 ML/MIN/1.73SQ M
GLUCOSE SERPL-MCNC: 94 MG/DL (ref 65–140)
HCT VFR BLD AUTO: 31.2 % (ref 36.5–49.3)
HGB BLD-MCNC: 9.5 G/DL (ref 12–17)
MAGNESIUM SERPL-MCNC: 2.1 MG/DL (ref 1.9–2.7)
MCH RBC QN AUTO: 27 PG (ref 26.8–34.3)
MCHC RBC AUTO-ENTMCNC: 30.4 G/DL (ref 31.4–37.4)
MCV RBC AUTO: 89 FL (ref 82–98)
PLATELET # BLD AUTO: 197 THOUSANDS/UL (ref 149–390)
PMV BLD AUTO: 11.7 FL (ref 8.9–12.7)
POTASSIUM SERPL-SCNC: 4.1 MMOL/L (ref 3.5–5.3)
RBC # BLD AUTO: 3.52 MILLION/UL (ref 3.88–5.62)
SODIUM SERPL-SCNC: 139 MMOL/L (ref 135–147)
WBC # BLD AUTO: 7.37 THOUSAND/UL (ref 4.31–10.16)

## 2025-06-01 PROCEDURE — 85027 COMPLETE CBC AUTOMATED: CPT | Performed by: FAMILY MEDICINE

## 2025-06-01 PROCEDURE — 83735 ASSAY OF MAGNESIUM: CPT | Performed by: FAMILY MEDICINE

## 2025-06-01 PROCEDURE — 80048 BASIC METABOLIC PNL TOTAL CA: CPT | Performed by: FAMILY MEDICINE

## 2025-06-01 PROCEDURE — 99239 HOSP IP/OBS DSCHRG MGMT >30: CPT | Performed by: FAMILY MEDICINE

## 2025-06-01 RX ORDER — OXYCODONE HYDROCHLORIDE 5 MG/1
5 TABLET ORAL EVERY 4 HOURS PRN
Qty: 12 TABLET | Refills: 0 | Status: SHIPPED | OUTPATIENT
Start: 2025-06-01 | End: 2025-06-11

## 2025-06-01 RX ORDER — GUAIFENESIN/DEXTROMETHORPHAN 100-10MG/5
10 SYRUP ORAL EVERY 4 HOURS PRN
Qty: 118 ML | Refills: 0 | Status: SHIPPED | OUTPATIENT
Start: 2025-06-01

## 2025-06-01 NOTE — DISCHARGE SUMMARY
Discharge Summary - Hospitalist   Name: Stephen Carbone 60 y.o. male I MRN: [id number]  Unit/Bed#: -01 I Date of Admission: 5/30/2025   Date of Service: 6/1/2025 I Hospital Day: 2     Assessment & Plan  Sepsis without acute organ dysfunction (HCC)  Now, resolved (6/1)  Patient presented (5/30) from home with complaints of shortness of breath which has been persistent and progressive over the past several weeks however particularly worsened over the past week or so  Patient reports chest pain sensation on breathing and coughing which has been ongoing for several weeks    In ED, patient meeting SIRS criteria with tachycardia, tachypnea, leukocytosis-without fever, lactic acidosis, hemodynamic compromise  CTA negative for PE but revealed right lower lobe pneumonia  Index suspicion for pulmonary source of SIRS  Without hemodynamic compromise or evidence of endorgan damage/lactic acidosis-weight-based volume resuscitation unnecessary  Initiated on maintenance fluids of broad-spectrum antimicrobials  Integument intact  UA negative    Patient urinary antigen positive for Streptococcus pneumoniae  Suspicion therefore for streptococcal pneumonia    Fortunately, the patient and SIRS criteria have resolved and he has been liberated from supplemental oxygen-resolution of acute hypoxemic respiratory failure  Therefore, the patient is stable for discharge to home today (6/1/2025)  RLL pneumonia  Source of sepsis on presentation   Was maintained on Rocephin and Zithromax as empiric treatment over the course of hospitalization  Now, evidence of streptococcal pneumonia  Will discharge to home to complete targeted antimicrobial therapy with Augmentin for additional 7 days  Class 3 severe obesity due to excess calories without serious comorbidity with body mass index (BMI) of 40.0 to 44.9 in adult  Recommend incorporating a more whole foods plant-predominant diet along with decreasing consumption of red meats and processed  foods  Per AHA guidelines, recommend moderate-vigorous intensity exercise for 30 minutes a day for 5 days a week or a total of 150 min/week     Medical Problems       Resolved Problems  Date Reviewed: 12/31/2020   None       Discharging Physician / Practitioner: Britton Coleman DO  PCP: Cassie Myrick MD  Admission Date:   Admission Orders (From admission, onward)       Ordered        05/30/25 1724  INPATIENT ADMISSION  Once                          Discharge Date: 06/01/25    Next Steps for Physician/AP Assuming Care:  Patient without previous primary care provider  Presented with shortness of breath and feeling unwell  Met sepsis criteria on presentation and found with streptococcal pneumonia  Will require establishment of care with primary care provider as a 60-year-old male without previous history of medical care    Test Results Pending at Discharge (will require follow up):  None    Medication Changes for Discharge & Rationale:   Augmentin  See after visit summary for reconciled discharge medications provided to patient and/or family.     Consultations During Hospital Stay:  None    Procedures Performed:   None    Significant Findings / Test Results:   Pneumonia    Incidental Findings:   None other than those noted in the problem specific assessment plan      Hospital Course:   Stephen Carbone is a 60 y.o. male patient who originally presented to the hospital on 5/30/2025 due to shortness of breath and feeling unwell.  Met sepsis criteria on presentation with index suspicion for source streptococcal pneumonia.  Transition to targeted antimicrobial therapy with resolution of respiratory symptoms and SIRS criteria.  Stable for discharge today (6/1)    This is a very brief summary of the patient's hospitalization-please see problem specific assessment and plan for details of the patient's hospital course.    Please see above list of diagnoses and related plan for additional information.     Discharge Day Visit  "/ Exam:   Subjective: Patient feels well-significantly improved from presentation and ready for discharge  Vitals: Blood Pressure: 129/72 (06/01/25 0724)  Pulse: 63 (06/01/25 0724)  Temperature: 98.2 °F (36.8 °C) (06/01/25 0724)  Temp Source: Temporal (06/01/25 0724)  Respirations: 18 (06/01/25 0724)  Height: 5' 10\" (177.8 cm) (05/30/25 1604)  Weight - Scale: 136 kg (299 lb 2.6 oz) (05/30/25 1604)  SpO2: 94 % (06/01/25 0724)  Physical Exam   Constitutional:       General: He is not in acute distress.     Appearance: He is obese. He is ill-appearing. He is not toxic-appearing or diaphoretic.   HENT:      Head: Normocephalic and atraumatic.      Right Ear: External ear normal.      Left Ear: External ear normal.      Mouth/Throat:      Pharynx: Oropharynx is clear.     Eyes:      Conjunctiva/sclera: Conjunctivae normal.     Pulmonary:      Effort: No respiratory distress.      Breath sounds: No stridor. Wheezing and rhonchi present. No rales.   Abdominal:      General: There is no distension.      Tenderness: There is no abdominal tenderness.     Musculoskeletal:      Right lower leg: No edema.      Left lower leg: No edema.     Skin:     Capillary Refill: Capillary refill takes less than 2 seconds.      Findings: No lesion.     Neurological:      Gait: Gait normal.     Psychiatric:         Mood and Affect: Mood normal.         Behavior: Behavior normal.       Discussion with Family: Patient declined call to .     Discharge instructions/Information to patient and family:   See after visit summary for information provided to patient and family.      Provisions for Follow-Up Care:  See after visit summary for information related to follow-up care and any pertinent home health orders.      Mobility at time of Discharge:   Basic Mobility Inpatient Raw Score: 24  JH-HLM Goal: 8: Walk 250 feet or more  JH-HLM Achieved: 8: Walk 250 feet ot more  HLM Goal achieved. Continue to encourage appropriate mobility.   "   Disposition:   Home    Planned Readmission: No    Administrative Statements   Discharge Statement:  I have spent a total time of 44 minutes in caring for this patient on the day of the visit/encounter. >30 minutes of time was spent on: Risks and benefits of tx options, Importance of tx compliance, Counseling / Coordination of care, and Documenting in the medical record.    **Please Note: This note may have been constructed using a voice recognition system**

## 2025-06-01 NOTE — NURSING NOTE
Patient's IV removed with catheter intact. AVS reviewed with patient including medications, follow up appointments, and s/s to monitor for. Patient verbalized understanding and escorted out on foot by this LPN.

## 2025-06-01 NOTE — ASSESSMENT & PLAN NOTE
Source of sepsis on presentation   Was maintained on Rocephin and Zithromax as empiric treatment over the course of hospitalization  Now, evidence of streptococcal pneumonia  Will discharge to home to complete targeted antimicrobial therapy with Augmentin for additional 7 days

## 2025-06-01 NOTE — PLAN OF CARE
Problem: PAIN - ADULT  Goal: Verbalizes/displays adequate comfort level or baseline comfort level  Description: Interventions:  - Encourage patient to monitor pain and request assistance  - Assess pain using appropriate pain scale  - Administer analgesics as ordered based on type and severity of pain and evaluate response  - Implement non-pharmacological measures as appropriate and evaluate response  - Consider cultural and social influences on pain and pain management  - Notify physician/advanced practitioner if interventions unsuccessful or patient reports new pain  - Educate patient/family on pain management process including their role and importance of  reporting pain   - Provide non-pharmacologic/complimentary pain relief interventions  Outcome: Progressing     Problem: INFECTION - ADULT  Goal: Absence or prevention of progression during hospitalization  Description: INTERVENTIONS:  - Assess and monitor for signs and symptoms of infection  - Monitor lab/diagnostic results  - Monitor all insertion sites, i.e. indwelling lines, tubes, and drains  - Monitor endotracheal if appropriate and nasal secretions for changes in amount and color  - [place] appropriate cooling/warming therapies per order  - Administer medications as ordered  - Instruct and encourage patient and family to use good hand hygiene technique  - Identify and instruct in appropriate isolation precautions for identified infection/condition  Outcome: Progressing  Goal: Absence of fever/infection during neutropenic period  Description: INTERVENTIONS:  - Monitor WBC  - Perform strict hand hygiene  - Limit to healthy visitors only  - No plants, dried, fresh or silk flowers with deleon in patient room  Outcome: Progressing     Problem: SAFETY ADULT  Goal: Patient will remain free of falls  Description: INTERVENTIONS:  - Educate patient/family on patient safety including physical limitations  - Instruct patient to call for assistance with activity   -  Consider consulting OT/PT to assist with strengthening/mobility based on AM PAC & JH-HLM score  - Consult OT/PT to assist with strengthening/mobility   - Keep Call bell within reach  - Keep bed low and locked with side rails adjusted as appropriate  - Keep care items and personal belongings within reach  - Initiate and maintain comfort rounds  - Make Fall Risk Sign visible to staff  - Apply yellow socks and bracelet for high fall risk patients  - Consider moving patient to room near nurses station  Outcome: Progressing  Goal: Maintain or return to baseline ADL function  Description: INTERVENTIONS:  -  Assess patient's ability to carry out ADLs; assess patient's baseline for ADL function and identify physical deficits which impact ability to perform ADLs (bathing, care of mouth/teeth, toileting, grooming, dressing, etc.)  - Assess/evaluate cause of self-care deficits   - Assess range of motion  - Assess patient's mobility; develop plan if impaired  - Assess patient's need for assistive devices and provide as appropriate  - Encourage maximum independence but intervene and supervise when necessary  - Involve family in performance of ADLs  - Assess for home care needs following discharge   - Consider OT consult to assist with ADL evaluation and planning for discharge  - Provide patient education as appropriate  - Monitor functional capacity and physical performance, use of AM PAC & JH-HLM   - Monitor gait, balance and fatigue with ambulation    Outcome: Progressing  Goal: Maintains/Returns to pre admission functional level  Description: INTERVENTIONS:  - Perform AM-PAC 6 Click Basic Mobility/ Daily Activity assessment daily.  - Set and communicate daily mobility goal to care team and patient/family/caregiver.   - Collaborate with rehabilitation services on mobility goals if consulted  - Out of bed for toileting  - Record patient progress and toleration of activity level   Outcome: Progressing     Problem: DISCHARGE  PLANNING  Goal: Discharge to home or other facility with appropriate resources  Description: INTERVENTIONS:  - Identify barriers to discharge w/patient and caregiver  - Arrange for needed discharge resources and transportation as appropriate  - Identify discharge learning needs (meds, wound care, etc.)  - Arrange for interpretive services to assist at discharge as needed  - Refer to Case Management Department for coordinating discharge planning if the patient needs post-hospital services based on physician/advanced practitioner order or complex needs related to functional status, cognitive ability, or social support system  Outcome: Progressing

## 2025-06-01 NOTE — ASSESSMENT & PLAN NOTE
Now, resolved (6/1)  Patient presented (5/30) from home with complaints of shortness of breath which has been persistent and progressive over the past several weeks however particularly worsened over the past week or so  Patient reports chest pain sensation on breathing and coughing which has been ongoing for several weeks    In ED, patient meeting SIRS criteria with tachycardia, tachypnea, leukocytosis-without fever, lactic acidosis, hemodynamic compromise  CTA negative for PE but revealed right lower lobe pneumonia  Index suspicion for pulmonary source of SIRS  Without hemodynamic compromise or evidence of endorgan damage/lactic acidosis-weight-based volume resuscitation unnecessary  Initiated on maintenance fluids of broad-spectrum antimicrobials  Integument intact  UA negative    Patient urinary antigen positive for Streptococcus pneumoniae  Suspicion therefore for streptococcal pneumonia    Fortunately, the patient and SIRS criteria have resolved and he has been liberated from supplemental oxygen-resolution of acute hypoxemic respiratory failure  Therefore, the patient is stable for discharge to home today (6/1/2025)

## 2025-06-01 NOTE — PLAN OF CARE
Problem: PAIN - ADULT  Goal: Verbalizes/displays adequate comfort level or baseline comfort level  Description: Interventions:  - Encourage patient to monitor pain and request assistance  - Assess pain using appropriate pain scale  - Administer analgesics as ordered based on type and severity of pain and evaluate response  - Implement non-pharmacological measures as appropriate and evaluate response  - Consider cultural and social influences on pain and pain management  - Notify physician/advanced practitioner if interventions unsuccessful or patient reports new pain  - Educate patient/family on pain management process including their role and importance of  reporting pain   - Provide non-pharmacologic/complimentary pain relief interventions  6/1/2025 0532 by Hever Mccracken RN  Outcome: Progressing  6/1/2025 0301 by Hever Mccracken RN  Outcome: Progressing     Problem: INFECTION - ADULT  Goal: Absence or prevention of progression during hospitalization  Description: INTERVENTIONS:  - Assess and monitor for signs and symptoms of infection  - Monitor lab/diagnostic results  - Monitor all insertion sites, i.e. indwelling lines, tubes, and drains  - Monitor endotracheal if appropriate and nasal secretions for changes in amount and color  - [place] appropriate cooling/warming therapies per order  - Administer medications as ordered  - Instruct and encourage patient and family to use good hand hygiene technique  - Identify and instruct in appropriate isolation precautions for identified infection/condition  6/1/2025 0532 by Hever Mccracken RN  Outcome: Progressing  6/1/2025 0301 by Hever Mccracken RN  Outcome: Progressing  Goal: Absence of fever/infection during neutropenic period  Description: INTERVENTIONS:  - Monitor WBC  - Perform strict hand hygiene  - Limit to healthy visitors only  - No plants, dried, fresh or silk flowers with deleon in patient room  6/1/2025 0532 by Hever Mccracken RN  Outcome:  Progressing  6/1/2025 0301 by Hever Mccracken RN  Outcome: Progressing     Problem: SAFETY ADULT  Goal: Patient will remain free of falls  Description: INTERVENTIONS:  - Educate patient/family on patient safety including physical limitations  - Instruct patient to call for assistance with activity   - Consider consulting OT/PT to assist with strengthening/mobility based on AM PAC & JH-HLM score  - Consult OT/PT to assist with strengthening/mobility   - Keep Call bell within reach  - Keep bed low and locked with side rails adjusted as appropriate  - Keep care items and personal belongings within reach  - Initiate and maintain comfort rounds  - Make Fall Risk Sign visible to staff  - Apply yellow socks and bracelet for high fall risk patients  - Consider moving patient to room near nurses station  6/1/2025 0532 by Hever Mccracken RN  Outcome: Progressing  6/1/2025 0301 by Hever Mccracken RN  Outcome: Progressing  Goal: Maintain or return to baseline ADL function  Description: INTERVENTIONS:  -  Assess patient's ability to carry out ADLs; assess patient's baseline for ADL function and identify physical deficits which impact ability to perform ADLs (bathing, care of mouth/teeth, toileting, grooming, dressing, etc.)  - Assess/evaluate cause of self-care deficits   - Assess range of motion  - Assess patient's mobility; develop plan if impaired  - Assess patient's need for assistive devices and provide as appropriate  - Encourage maximum independence but intervene and supervise when necessary  - Involve family in performance of ADLs  - Assess for home care needs following discharge   - Consider OT consult to assist with ADL evaluation and planning for discharge  - Provide patient education as appropriate  - Monitor functional capacity and physical performance, use of AM PAC & JH-HLM   - Monitor gait, balance and fatigue with ambulation    6/1/2025 0532 by Hever Mccracken RN  Outcome: Progressing  6/1/2025 0301  by Hever Mccracken RN  Outcome: Progressing  Goal: Maintains/Returns to pre admission functional level  Description: INTERVENTIONS:  - Perform AM-PAC 6 Click Basic Mobility/ Daily Activity assessment daily.  - Set and communicate daily mobility goal to care team and patient/family/caregiver.   - Collaborate with rehabilitation services on mobility goals if consulted  - Out of bed for toileting  - Record patient progress and toleration of activity level   6/1/2025 0532 by Hever Mccracken RN  Outcome: Progressing  6/1/2025 0301 by Hever Mccracken RN  Outcome: Progressing     Problem: DISCHARGE PLANNING  Goal: Discharge to home or other facility with appropriate resources  Description: INTERVENTIONS:  - Identify barriers to discharge w/patient and caregiver  - Arrange for needed discharge resources and transportation as appropriate  - Identify discharge learning needs (meds, wound care, etc.)  - Arrange for interpretive services to assist at discharge as needed  - Refer to Case Management Department for coordinating discharge planning if the patient needs post-hospital services based on physician/advanced practitioner order or complex needs related to functional status, cognitive ability, or social support system  6/1/2025 0532 by Hever Mccracken RN  Outcome: Progressing  6/1/2025 0301 by Hever Mccracken RN  Outcome: Progressing

## 2025-06-01 NOTE — CASE MANAGEMENT
Case Management Discharge Planning Note    Patient name Stephen Carbone  Location /-01 MRN [id number]  : 1965 Date 2025       Current Admission Date: 2025  Current Admission Diagnosis:Sepsis without acute organ dysfunction (HCC)   Patient Active Problem List    Diagnosis Date Noted    Sepsis without acute organ dysfunction (HCC) 2025    RLL pneumonia 2025    Morbid obesity (HCC) 2020    Class 3 severe obesity due to excess calories without serious comorbidity with body mass index (BMI) of 40.0 to 44.9 in adult 2020    Hypokalemia 2020    Thrombocytopenia (HCC) 2020    Essential tremor 2020      LOS (days): 2  Geometric Mean LOS (GMLOS) (days):   Days to GMLOS:     OBJECTIVE:  Risk of Unplanned Readmission Score: 11.09         Current admission status: Inpatient   Preferred Pharmacy:   [facility]/pharmacy #[id number] - [address] [address]  Phone: [phone] Fax: [phone]    [id number] [place] [id number] [address] [address]  Phone: [phone] Fax: [phone]    Primary Care Provider: Cassie Myrick MD    Primary Insurance:   Secondary Insurance:     DISCHARGE DETAILS:           CM had MD place order to Internal Medicine as patient would like PCP follow up with [facility] but CM unable to schedule appt on  for patient, to establish care.

## 2025-06-02 LAB
ATRIAL RATE: 96 BPM
P AXIS: 30 DEGREES
PR INTERVAL: 178 MS
QRS AXIS: 13 DEGREES
QRSD INTERVAL: 98 MS
QT INTERVAL: 336 MS
QTC INTERVAL: 424 MS
T WAVE AXIS: 34 DEGREES
VENTRICULAR RATE: 96 BPM

## 2025-06-02 PROCEDURE — 93010 ELECTROCARDIOGRAM REPORT: CPT | Performed by: INTERNAL MEDICINE

## 2025-06-03 LAB
BACTERIA SPT RESP CULT: ABNORMAL
GRAM STN SPEC: ABNORMAL

## 2025-06-04 LAB
BACTERIA BLD CULT: NORMAL
BACTERIA BLD CULT: NORMAL